# Patient Record
Sex: MALE | Race: WHITE | NOT HISPANIC OR LATINO | Employment: OTHER | URBAN - METROPOLITAN AREA
[De-identification: names, ages, dates, MRNs, and addresses within clinical notes are randomized per-mention and may not be internally consistent; named-entity substitution may affect disease eponyms.]

---

## 2023-10-01 DIAGNOSIS — M54.50 LOW BACK PAIN, UNSPECIFIED BACK PAIN LATERALITY, UNSPECIFIED CHRONICITY, UNSPECIFIED WHETHER SCIATICA PRESENT: ICD-10-CM

## 2023-10-01 DIAGNOSIS — F41.9 ANXIETY: ICD-10-CM

## 2023-10-01 DIAGNOSIS — I10 ACCELERATED HYPERTENSION: ICD-10-CM

## 2023-10-01 DIAGNOSIS — K52.9 CHRONIC COLITIS: ICD-10-CM

## 2023-10-04 RX ORDER — ASPIRIN 325 MG
325 TABLET ORAL DAILY
Qty: 90 TABLET | Refills: 0 | Status: SHIPPED | OUTPATIENT
Start: 2023-10-04 | End: 2024-01-02

## 2023-10-04 RX ORDER — LISINOPRIL 10 MG/1
10 TABLET ORAL DAILY
Qty: 90 TABLET | Refills: 0 | Status: SHIPPED | OUTPATIENT
Start: 2023-10-04 | End: 2023-10-09

## 2023-10-04 RX ORDER — SULFASALAZINE 500 MG/1
500 TABLET ORAL DAILY
Qty: 90 TABLET | Refills: 0 | Status: SHIPPED | OUTPATIENT
Start: 2023-10-04 | End: 2023-10-27 | Stop reason: SDUPTHER

## 2023-10-04 RX ORDER — SERTRALINE HYDROCHLORIDE 50 MG/1
50 TABLET, FILM COATED ORAL DAILY
Qty: 90 TABLET | Refills: 0 | Status: SHIPPED | OUTPATIENT
Start: 2023-10-04 | End: 2024-01-02

## 2023-10-06 DIAGNOSIS — E78.00 ELEVATED CHOLESTEROL: ICD-10-CM

## 2023-10-06 DIAGNOSIS — E11.9 TYPE 2 DIABETES MELLITUS WITHOUT COMPLICATIONS (MULTI): ICD-10-CM

## 2023-10-06 DIAGNOSIS — I10 ACCELERATED HYPERTENSION: ICD-10-CM

## 2023-10-09 DIAGNOSIS — I10 ACCELERATED HYPERTENSION: ICD-10-CM

## 2023-10-09 RX ORDER — LISINOPRIL 10 MG/1
10 TABLET ORAL DAILY
Qty: 30 TABLET | Refills: 0 | Status: SHIPPED | OUTPATIENT
Start: 2023-10-09 | End: 2023-12-21

## 2023-10-09 RX ORDER — AMLODIPINE BESYLATE 10 MG/1
10 TABLET ORAL DAILY
Qty: 30 TABLET | Refills: 0 | Status: SHIPPED | OUTPATIENT
Start: 2023-10-09 | End: 2024-03-25

## 2023-10-09 RX ORDER — LISINOPRIL 10 MG/1
10 TABLET ORAL DAILY
Qty: 90 TABLET | Refills: 0 | OUTPATIENT
Start: 2023-10-09

## 2023-10-09 RX ORDER — ATORVASTATIN CALCIUM 20 MG/1
20 TABLET, FILM COATED ORAL DAILY
Qty: 30 TABLET | Refills: 0 | Status: SHIPPED | OUTPATIENT
Start: 2023-10-09

## 2023-10-09 RX ORDER — METFORMIN HYDROCHLORIDE 1000 MG/1
1000 TABLET ORAL
Qty: 60 TABLET | Refills: 0 | Status: SHIPPED | OUTPATIENT
Start: 2023-10-09 | End: 2023-10-27 | Stop reason: SDUPTHER

## 2023-10-25 PROBLEM — E78.5 HYPERLIPIDEMIA: Status: ACTIVE | Noted: 2023-10-25

## 2023-10-25 PROBLEM — E11.9 DIABETES MELLITUS (MULTI): Status: ACTIVE | Noted: 2023-10-25

## 2023-10-25 PROBLEM — E29.9 TESTICULAR DYSFUNCTION: Status: ACTIVE | Noted: 2023-10-25

## 2023-10-25 PROBLEM — E55.9 VITAMIN D DEFICIENCY: Status: ACTIVE | Noted: 2023-10-25

## 2023-10-25 PROBLEM — E11.9 TYPE 2 DIABETES MELLITUS WITHOUT COMPLICATION (MULTI): Status: ACTIVE | Noted: 2023-10-25

## 2023-10-25 PROBLEM — F17.200 SMOKING: Status: ACTIVE | Noted: 2023-10-25

## 2023-10-25 PROBLEM — D72.829 LEUKOCYTOSIS: Status: ACTIVE | Noted: 2023-10-25

## 2023-10-25 PROBLEM — I10 HTN (HYPERTENSION): Status: ACTIVE | Noted: 2023-10-25

## 2023-10-25 PROBLEM — J44.9 COPD (CHRONIC OBSTRUCTIVE PULMONARY DISEASE) (MULTI): Status: ACTIVE | Noted: 2023-10-25

## 2023-10-25 PROBLEM — I73.9 PERIPHERAL VASCULAR DISEASE (CMS-HCC): Status: ACTIVE | Noted: 2023-10-25

## 2023-10-25 PROBLEM — F43.21 GRIEF: Status: ACTIVE | Noted: 2023-10-25

## 2023-10-25 PROBLEM — R91.1 LUNG NODULE: Status: ACTIVE | Noted: 2023-10-25

## 2023-10-25 RX ORDER — SULFASALAZINE 500 MG/1
2 TABLET ORAL 2 TIMES DAILY
COMMUNITY

## 2023-10-25 RX ORDER — METFORMIN HYDROCHLORIDE 1000 MG/1
1 TABLET ORAL
COMMUNITY
Start: 2016-12-20 | End: 2024-03-08 | Stop reason: SDUPTHER

## 2023-10-25 RX ORDER — BLOOD-GLUCOSE METER
KIT MISCELLANEOUS 2 TIMES DAILY
COMMUNITY
Start: 2013-10-15

## 2023-10-25 RX ORDER — NAPROXEN SODIUM 220 MG/1
1 TABLET, FILM COATED ORAL DAILY
COMMUNITY

## 2023-10-25 RX ORDER — BUDESONIDE AND FORMOTEROL FUMARATE DIHYDRATE 160; 4.5 UG/1; UG/1
2 AEROSOL RESPIRATORY (INHALATION) 2 TIMES DAILY
COMMUNITY
Start: 2016-01-18 | End: 2024-01-04

## 2023-10-25 RX ORDER — TAMSULOSIN HYDROCHLORIDE 0.4 MG/1
1 CAPSULE ORAL DAILY
COMMUNITY
Start: 2022-01-22

## 2023-10-25 RX ORDER — ERGOCALCIFEROL 1.25 MG/1
1 CAPSULE ORAL
COMMUNITY
Start: 2022-01-27

## 2023-10-25 RX ORDER — IPRATROPIUM BROMIDE 17 UG/1
2 AEROSOL, METERED RESPIRATORY (INHALATION) 4 TIMES DAILY PRN
COMMUNITY
Start: 2011-06-28 | End: 2024-04-05

## 2023-10-25 RX ORDER — LANCETS
EACH MISCELLANEOUS 2 TIMES DAILY
COMMUNITY
Start: 2013-10-15

## 2023-10-25 RX ORDER — POTASSIUM CITRATE 10 MEQ/1
1 TABLET, EXTENDED RELEASE ORAL 2 TIMES DAILY
COMMUNITY
Start: 2022-11-03

## 2023-10-25 RX ORDER — ALBUTEROL SULFATE 90 UG/1
2 AEROSOL, METERED RESPIRATORY (INHALATION) EVERY 4 HOURS PRN
COMMUNITY
Start: 2016-10-27

## 2023-10-25 RX ORDER — ATORVASTATIN CALCIUM 20 MG/1
1 TABLET, FILM COATED ORAL DAILY
COMMUNITY

## 2023-10-25 RX ORDER — NITROFURANTOIN (MACROCRYSTALS) 100 MG/1
1 CAPSULE ORAL 4 TIMES DAILY
COMMUNITY

## 2023-10-27 ENCOUNTER — OFFICE VISIT (OUTPATIENT)
Dept: PRIMARY CARE | Facility: CLINIC | Age: 68
End: 2023-10-27
Payer: MEDICARE

## 2023-10-27 VITALS
HEART RATE: 101 BPM | BODY MASS INDEX: 18.8 KG/M2 | HEIGHT: 66 IN | DIASTOLIC BLOOD PRESSURE: 60 MMHG | OXYGEN SATURATION: 96 % | WEIGHT: 117 LBS | RESPIRATION RATE: 20 BRPM | SYSTOLIC BLOOD PRESSURE: 136 MMHG | TEMPERATURE: 98 F

## 2023-10-27 DIAGNOSIS — E11.9 TYPE 2 DIABETES MELLITUS WITHOUT COMPLICATION, UNSPECIFIED WHETHER LONG TERM INSULIN USE (MULTI): ICD-10-CM

## 2023-10-27 DIAGNOSIS — K52.9 CHRONIC COLITIS: ICD-10-CM

## 2023-10-27 DIAGNOSIS — R53.83 OTHER FATIGUE: ICD-10-CM

## 2023-10-27 DIAGNOSIS — E11.9 TYPE 2 DIABETES MELLITUS WITHOUT COMPLICATIONS (MULTI): ICD-10-CM

## 2023-10-27 DIAGNOSIS — Z00.00 ANNUAL PHYSICAL EXAM: Primary | ICD-10-CM

## 2023-10-27 DIAGNOSIS — Z23 ENCOUNTER FOR IMMUNIZATION: ICD-10-CM

## 2023-10-27 LAB
ALBUMIN SERPL-MCNC: 4.1 G/DL (ref 3.5–5)
ALP BLD-CCNC: 100 U/L (ref 35–125)
ALT SERPL-CCNC: 17 U/L (ref 5–40)
ANION GAP SERPL CALC-SCNC: 14 MMOL/L
AST SERPL-CCNC: 17 U/L (ref 5–40)
BASOPHILS # BLD AUTO: 0.09 X10*3/UL (ref 0–0.1)
BASOPHILS NFR BLD AUTO: 1 %
BILIRUB SERPL-MCNC: 0.3 MG/DL (ref 0.1–1.2)
BUN SERPL-MCNC: 17 MG/DL (ref 8–25)
CALCIUM SERPL-MCNC: 9.6 MG/DL (ref 8.5–10.4)
CHLORIDE SERPL-SCNC: 103 MMOL/L (ref 97–107)
CHOLEST SERPL-MCNC: 126 MG/DL (ref 133–200)
CHOLEST/HDLC SERPL: 3 {RATIO}
CO2 SERPL-SCNC: 25 MMOL/L (ref 24–31)
CREAT SERPL-MCNC: 0.9 MG/DL (ref 0.4–1.6)
EOSINOPHIL # BLD AUTO: 0.3 X10*3/UL (ref 0–0.7)
EOSINOPHIL NFR BLD AUTO: 3.2 %
ERYTHROCYTE [DISTWIDTH] IN BLOOD BY AUTOMATED COUNT: 13.3 % (ref 11.5–14.5)
GFR SERPL CREATININE-BSD FRML MDRD: >90 ML/MIN/1.73M*2
GLUCOSE SERPL-MCNC: 90 MG/DL (ref 65–99)
HCT VFR BLD AUTO: 39.9 % (ref 41–52)
HDLC SERPL-MCNC: 42 MG/DL
HGB BLD-MCNC: 13.2 G/DL (ref 13.5–17.5)
IMM GRANULOCYTES # BLD AUTO: 0.09 X10*3/UL (ref 0–0.7)
IMM GRANULOCYTES NFR BLD AUTO: 1 % (ref 0–0.9)
LDLC SERPL CALC-MCNC: 72 MG/DL (ref 65–130)
LYMPHOCYTES # BLD AUTO: 1.92 X10*3/UL (ref 1.2–4.8)
LYMPHOCYTES NFR BLD AUTO: 20.4 %
MCH RBC QN AUTO: 31.5 PG (ref 26–34)
MCHC RBC AUTO-ENTMCNC: 33.1 G/DL (ref 32–36)
MCV RBC AUTO: 95 FL (ref 80–100)
MONOCYTES # BLD AUTO: 0.7 X10*3/UL (ref 0.1–1)
MONOCYTES NFR BLD AUTO: 7.4 %
NEUTROPHILS # BLD AUTO: 6.32 X10*3/UL (ref 1.2–7.7)
NEUTROPHILS NFR BLD AUTO: 67 %
NRBC BLD-RTO: 0 /100 WBCS (ref 0–0)
PLATELET # BLD AUTO: 354 X10*3/UL (ref 150–450)
PMV BLD AUTO: 10.2 FL (ref 7.5–11.5)
POC HEMOGLOBIN A1C: 5.9 % (ref 4.2–6.5)
POTASSIUM SERPL-SCNC: 4.7 MMOL/L (ref 3.4–5.1)
PROT SERPL-MCNC: 6.5 G/DL (ref 5.9–7.9)
RBC # BLD AUTO: 4.19 X10*6/UL (ref 4.5–5.9)
SODIUM SERPL-SCNC: 142 MMOL/L (ref 133–145)
TRIGL SERPL-MCNC: 61 MG/DL (ref 40–150)
TSH SERPL DL<=0.05 MIU/L-ACNC: 2.36 MIU/L (ref 0.27–4.2)
WBC # BLD AUTO: 9.4 X10*3/UL (ref 4.4–11.3)

## 2023-10-27 PROCEDURE — 80061 LIPID PANEL: CPT | Performed by: FAMILY MEDICINE

## 2023-10-27 PROCEDURE — 85025 COMPLETE CBC W/AUTO DIFF WBC: CPT | Performed by: FAMILY MEDICINE

## 2023-10-27 PROCEDURE — 99397 PER PM REEVAL EST PAT 65+ YR: CPT | Performed by: FAMILY MEDICINE

## 2023-10-27 PROCEDURE — 84443 ASSAY THYROID STIM HORMONE: CPT | Performed by: FAMILY MEDICINE

## 2023-10-27 PROCEDURE — 84075 ASSAY ALKALINE PHOSPHATASE: CPT | Performed by: FAMILY MEDICINE

## 2023-10-27 PROCEDURE — 1159F MED LIST DOCD IN RCRD: CPT | Performed by: FAMILY MEDICINE

## 2023-10-27 PROCEDURE — 1126F AMNT PAIN NOTED NONE PRSNT: CPT | Performed by: FAMILY MEDICINE

## 2023-10-27 PROCEDURE — 3075F SYST BP GE 130 - 139MM HG: CPT | Performed by: FAMILY MEDICINE

## 2023-10-27 PROCEDURE — 83036 HEMOGLOBIN GLYCOSYLATED A1C: CPT | Mod: QW | Performed by: FAMILY MEDICINE

## 2023-10-27 PROCEDURE — 3078F DIAST BP <80 MM HG: CPT | Performed by: FAMILY MEDICINE

## 2023-10-27 PROCEDURE — 3048F LDL-C <100 MG/DL: CPT | Performed by: FAMILY MEDICINE

## 2023-10-27 PROCEDURE — 36415 COLL VENOUS BLD VENIPUNCTURE: CPT | Performed by: FAMILY MEDICINE

## 2023-10-27 PROCEDURE — 99397 PER PM REEVAL EST PAT 65+ YR: CPT | Mod: MUE | Performed by: FAMILY MEDICINE

## 2023-10-27 PROCEDURE — 4010F ACE/ARB THERAPY RXD/TAKEN: CPT | Performed by: FAMILY MEDICINE

## 2023-10-27 PROCEDURE — 90662 IIV NO PRSV INCREASED AG IM: CPT | Performed by: FAMILY MEDICINE

## 2023-10-27 RX ORDER — METFORMIN HYDROCHLORIDE 1000 MG/1
1000 TABLET ORAL
Qty: 60 TABLET | Refills: 0 | Status: SHIPPED | OUTPATIENT
Start: 2023-10-27 | End: 2023-12-07

## 2023-10-27 RX ORDER — SULFASALAZINE 500 MG/1
500 TABLET ORAL DAILY
Qty: 90 TABLET | Refills: 0 | Status: SHIPPED | OUTPATIENT
Start: 2023-10-27 | End: 2024-04-05

## 2023-10-27 ASSESSMENT — PAIN SCALES - GENERAL: PAINLEVEL: 0-NO PAIN

## 2023-10-27 NOTE — PROGRESS NOTES
"Subjective   Patient ID: Jason Martin is a 68 y.o. male who presents for Annual Exam (Pt here for physical).    HPI     Review of Systems    Objective   /60 (BP Location: Left arm, Patient Position: Sitting, BP Cuff Size: Adult)   Pulse 101   Temp 36.7 °C (98 °F) (Temporal)   Resp 20   Ht 1.676 m (5' 6\")   Wt 53.1 kg (117 lb)   SpO2 96%   BMI 18.88 kg/m²     Physical Exam  Vitals and nursing note reviewed.   Constitutional:       General: He is not in acute distress.  HENT:      Right Ear: Tympanic membrane and ear canal normal.      Left Ear: Tympanic membrane and ear canal normal.      Nose: Nose normal. No rhinorrhea.      Mouth/Throat:      Pharynx: Oropharynx is clear. No oropharyngeal exudate or posterior oropharyngeal erythema.      Comments: Dentition wnl  Eyes:      Extraocular Movements: Extraocular movements intact.      Conjunctiva/sclera: Conjunctivae normal.      Pupils: Pupils are equal, round, and reactive to light.   Neck:      Vascular: No carotid bruit.   Cardiovascular:      Rate and Rhythm: Normal rate and regular rhythm.      Heart sounds: Normal heart sounds. No murmur heard.  Pulmonary:      Breath sounds: Normal breath sounds. No wheezing or rhonchi.   Abdominal:      General: Bowel sounds are normal. There is no distension.      Palpations: Abdomen is soft. There is no mass.      Tenderness: There is no abdominal tenderness. There is no guarding or rebound.      Hernia: No hernia is present.   Musculoskeletal:         General: No swelling or tenderness. Normal range of motion.      Cervical back: Normal range of motion and neck supple.   Lymphadenopathy:      Cervical: No cervical adenopathy.   Skin:     General: Skin is warm.      Findings: No rash.   Neurological:      General: No focal deficit present.      Mental Status: He is alert.         Assessment/Plan   Problem List Items Addressed This Visit             ICD-10-CM    Type 2 diabetes mellitus without complication " (CMS/[facility]) E11.9    Relevant Orders    POCT glycosylated hemoglobin (Hb A1C) manually resulted

## 2023-12-07 DIAGNOSIS — E11.9 TYPE 2 DIABETES MELLITUS WITHOUT COMPLICATIONS (MULTI): ICD-10-CM

## 2023-12-07 RX ORDER — METFORMIN HYDROCHLORIDE 1000 MG/1
1000 TABLET ORAL
Qty: 60 TABLET | Refills: 0 | Status: SHIPPED | OUTPATIENT
Start: 2023-12-07 | End: 2024-02-01

## 2023-12-21 DIAGNOSIS — I10 ACCELERATED HYPERTENSION: ICD-10-CM

## 2023-12-21 RX ORDER — LISINOPRIL 10 MG/1
10 TABLET ORAL DAILY
Qty: 90 TABLET | Refills: 1 | Status: SHIPPED | OUTPATIENT
Start: 2023-12-21 | End: 2024-03-20

## 2023-12-30 DIAGNOSIS — M54.50 LOW BACK PAIN, UNSPECIFIED BACK PAIN LATERALITY, UNSPECIFIED CHRONICITY, UNSPECIFIED WHETHER SCIATICA PRESENT: ICD-10-CM

## 2023-12-30 DIAGNOSIS — J42 CHRONIC BRONCHITIS, UNSPECIFIED CHRONIC BRONCHITIS TYPE (MULTI): ICD-10-CM

## 2023-12-30 DIAGNOSIS — F41.9 ANXIETY: ICD-10-CM

## 2024-01-02 RX ORDER — SERTRALINE HYDROCHLORIDE 50 MG/1
50 TABLET, FILM COATED ORAL DAILY
Qty: 90 TABLET | Refills: 0 | Status: SHIPPED | OUTPATIENT
Start: 2024-01-02 | End: 2024-02-01

## 2024-01-02 RX ORDER — ASPIRIN 325 MG
325 TABLET ORAL DAILY
Qty: 90 TABLET | Refills: 0 | Status: SHIPPED | OUTPATIENT
Start: 2024-01-02 | End: 2024-04-05

## 2024-01-04 RX ORDER — BUDESONIDE AND FORMOTEROL FUMARATE DIHYDRATE 160; 4.5 UG/1; UG/1
AEROSOL RESPIRATORY (INHALATION)
Qty: 30.6 EACH | Refills: 1 | Status: SHIPPED | OUTPATIENT
Start: 2024-01-04 | End: 2024-04-05

## 2024-02-01 DIAGNOSIS — F41.9 ANXIETY: ICD-10-CM

## 2024-02-01 DIAGNOSIS — E11.9 TYPE 2 DIABETES MELLITUS WITHOUT COMPLICATIONS (MULTI): ICD-10-CM

## 2024-02-01 RX ORDER — SERTRALINE HYDROCHLORIDE 50 MG/1
50 TABLET, FILM COATED ORAL DAILY
Qty: 90 TABLET | Refills: 0 | Status: SHIPPED | OUTPATIENT
Start: 2024-02-01 | End: 2024-05-02

## 2024-02-01 RX ORDER — METFORMIN HYDROCHLORIDE 1000 MG/1
1000 TABLET ORAL
Qty: 60 TABLET | Refills: 0 | Status: SHIPPED | OUTPATIENT
Start: 2024-02-01

## 2024-03-05 DIAGNOSIS — E11.9 TYPE 2 DIABETES MELLITUS WITHOUT COMPLICATIONS (MULTI): ICD-10-CM

## 2024-03-07 RX ORDER — METFORMIN HYDROCHLORIDE 1000 MG/1
1000 TABLET ORAL
Qty: 60 TABLET | Refills: 0 | OUTPATIENT
Start: 2024-03-07

## 2024-03-08 RX ORDER — METFORMIN HYDROCHLORIDE 1000 MG/1
1000 TABLET ORAL
Qty: 60 TABLET | Refills: 0 | Status: SHIPPED | OUTPATIENT
Start: 2024-03-08 | End: 2024-04-05

## 2024-03-25 DIAGNOSIS — E78.5 HYPERLIPIDEMIA, UNSPECIFIED: ICD-10-CM

## 2024-03-25 DIAGNOSIS — I10 ACCELERATED HYPERTENSION: ICD-10-CM

## 2024-03-25 RX ORDER — AMLODIPINE BESYLATE 10 MG/1
10 TABLET ORAL DAILY
Qty: 90 TABLET | Refills: 1 | Status: SHIPPED | OUTPATIENT
Start: 2024-03-25 | End: 2024-09-21

## 2024-03-25 RX ORDER — ATORVASTATIN CALCIUM 20 MG/1
20 TABLET, FILM COATED ORAL DAILY
Qty: 90 TABLET | Refills: 1 | Status: SHIPPED | OUTPATIENT
Start: 2024-03-25 | End: 2024-09-21

## 2024-04-03 DIAGNOSIS — M54.50 LOW BACK PAIN, UNSPECIFIED BACK PAIN LATERALITY, UNSPECIFIED CHRONICITY, UNSPECIFIED WHETHER SCIATICA PRESENT: ICD-10-CM

## 2024-04-03 DIAGNOSIS — K52.9 CHRONIC COLITIS: ICD-10-CM

## 2024-04-03 DIAGNOSIS — J44.9 CHRONIC OBSTRUCTIVE PULMONARY DISEASE, UNSPECIFIED COPD TYPE (MULTI): Primary | ICD-10-CM

## 2024-04-03 DIAGNOSIS — E11.9 TYPE 2 DIABETES MELLITUS WITHOUT COMPLICATIONS (MULTI): ICD-10-CM

## 2024-04-04 DIAGNOSIS — J42 CHRONIC BRONCHITIS, UNSPECIFIED CHRONIC BRONCHITIS TYPE (MULTI): ICD-10-CM

## 2024-04-05 DIAGNOSIS — J42 CHRONIC BRONCHITIS, UNSPECIFIED CHRONIC BRONCHITIS TYPE (MULTI): ICD-10-CM

## 2024-04-05 RX ORDER — METFORMIN HYDROCHLORIDE 1000 MG/1
1000 TABLET ORAL
Qty: 180 TABLET | Refills: 1 | Status: SHIPPED | OUTPATIENT
Start: 2024-04-05 | End: 2024-10-02

## 2024-04-05 RX ORDER — ASPIRIN 325 MG
325 TABLET ORAL DAILY
Qty: 90 TABLET | Refills: 1 | Status: SHIPPED | OUTPATIENT
Start: 2024-04-05

## 2024-04-05 RX ORDER — FLUTICASONE PROPIONATE AND SALMETEROL XINAFOATE 45; 21 UG/1; UG/1
2 AEROSOL, METERED RESPIRATORY (INHALATION)
Qty: 12 G | Refills: 3 | Status: SHIPPED | OUTPATIENT
Start: 2024-04-05 | End: 2024-04-30 | Stop reason: SDUPTHER

## 2024-04-05 RX ORDER — SULFASALAZINE 500 MG/1
500 TABLET ORAL DAILY
Qty: 90 TABLET | Refills: 3 | Status: SHIPPED | OUTPATIENT
Start: 2024-04-05

## 2024-04-05 RX ORDER — IPRATROPIUM BROMIDE 17 UG/1
2 AEROSOL, METERED RESPIRATORY (INHALATION) 4 TIMES DAILY PRN
Qty: 38.7 G | Refills: 1 | Status: SHIPPED | OUTPATIENT
Start: 2024-04-05 | End: 2024-04-30 | Stop reason: SDUPTHER

## 2024-04-09 RX ORDER — FLUTICASONE PROPIONATE AND SALMETEROL XINAFOATE 45; 21 UG/1; UG/1
AEROSOL, METERED RESPIRATORY (INHALATION)
Refills: 0 | OUTPATIENT
Start: 2024-04-09

## 2024-04-30 DIAGNOSIS — J42 CHRONIC BRONCHITIS, UNSPECIFIED CHRONIC BRONCHITIS TYPE (MULTI): ICD-10-CM

## 2024-04-30 DIAGNOSIS — J44.9 CHRONIC OBSTRUCTIVE PULMONARY DISEASE, UNSPECIFIED COPD TYPE (MULTI): ICD-10-CM

## 2024-04-30 DIAGNOSIS — F41.9 ANXIETY: ICD-10-CM

## 2024-04-30 RX ORDER — FLUTICASONE PROPIONATE AND SALMETEROL XINAFOATE 45; 21 UG/1; UG/1
2 AEROSOL, METERED RESPIRATORY (INHALATION)
Qty: 12 G | Refills: 3 | Status: SHIPPED | OUTPATIENT
Start: 2024-04-30

## 2024-04-30 RX ORDER — IPRATROPIUM BROMIDE 17 UG/1
2 AEROSOL, METERED RESPIRATORY (INHALATION) 4 TIMES DAILY PRN
Qty: 38.7 G | Refills: 11 | Status: SHIPPED | OUTPATIENT
Start: 2024-04-30 | End: 2025-04-30

## 2024-05-02 RX ORDER — SERTRALINE HYDROCHLORIDE 50 MG/1
50 TABLET, FILM COATED ORAL DAILY
Qty: 90 TABLET | Refills: 1 | Status: SHIPPED | OUTPATIENT
Start: 2024-05-02

## 2024-05-02 RX ORDER — FLUTICASONE PROPIONATE AND SALMETEROL XINAFOATE 45; 21 UG/1; UG/1
2 AEROSOL, METERED RESPIRATORY (INHALATION)
Qty: 31 G | Refills: 3 | Status: SHIPPED | OUTPATIENT
Start: 2024-05-02 | End: 2024-06-01

## 2024-05-02 RX ORDER — FLUTICASONE PROPIONATE AND SALMETEROL XINAFOATE 45; 21 UG/1; UG/1
AEROSOL, METERED RESPIRATORY (INHALATION)
Refills: 0 | OUTPATIENT
Start: 2024-05-02

## 2024-05-13 DIAGNOSIS — J44.9 CHRONIC OBSTRUCTIVE PULMONARY DISEASE, UNSPECIFIED COPD TYPE (MULTI): Primary | ICD-10-CM

## 2024-05-14 RX ORDER — BUDESONIDE AND FORMOTEROL FUMARATE DIHYDRATE 160; 4.5 UG/1; UG/1
2 AEROSOL RESPIRATORY (INHALATION)
Qty: 10.2 G | Refills: 11 | Status: SHIPPED | OUTPATIENT
Start: 2024-05-14 | End: 2024-05-16

## 2024-05-16 DIAGNOSIS — J44.9 CHRONIC OBSTRUCTIVE PULMONARY DISEASE, UNSPECIFIED COPD TYPE (MULTI): ICD-10-CM

## 2024-05-17 RX ORDER — FLUTICASONE FUROATE AND VILANTEROL 100; 25 UG/1; UG/1
1 POWDER RESPIRATORY (INHALATION) DAILY
Qty: 1 EACH | Refills: 3 | Status: SHIPPED | OUTPATIENT
Start: 2024-05-17

## 2024-07-01 DIAGNOSIS — I10 ACCELERATED HYPERTENSION: ICD-10-CM

## 2024-07-01 RX ORDER — LISINOPRIL 10 MG/1
10 TABLET ORAL DAILY
Qty: 90 TABLET | Refills: 0 | Status: SHIPPED | OUTPATIENT
Start: 2024-07-01

## 2024-07-08 DIAGNOSIS — E78.5 HYPERLIPIDEMIA, UNSPECIFIED: ICD-10-CM

## 2024-07-09 RX ORDER — ATORVASTATIN CALCIUM 20 MG/1
20 TABLET, FILM COATED ORAL DAILY
Qty: 90 TABLET | Refills: 1 | Status: SHIPPED | OUTPATIENT
Start: 2024-07-09

## 2024-09-05 ENCOUNTER — LAB (OUTPATIENT)
Dept: LAB | Facility: LAB | Age: 69
End: 2024-09-05
Payer: MEDICARE

## 2024-09-05 ENCOUNTER — OFFICE VISIT (OUTPATIENT)
Dept: PRIMARY CARE | Facility: CLINIC | Age: 69
End: 2024-09-05
Payer: MEDICARE

## 2024-09-05 ENCOUNTER — HOSPITAL ENCOUNTER (OUTPATIENT)
Dept: RADIOLOGY | Facility: HOSPITAL | Age: 69
Discharge: HOME | End: 2024-09-05
Payer: MEDICARE

## 2024-09-05 VITALS
HEART RATE: 101 BPM | HEIGHT: 66 IN | RESPIRATION RATE: 18 BRPM | SYSTOLIC BLOOD PRESSURE: 110 MMHG | BODY MASS INDEX: 16.04 KG/M2 | WEIGHT: 99.8 LBS | TEMPERATURE: 97.7 F | DIASTOLIC BLOOD PRESSURE: 60 MMHG | OXYGEN SATURATION: 93 %

## 2024-09-05 DIAGNOSIS — F17.200 HIGH DEPENDENCE ON SMOKING: Primary | ICD-10-CM
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
## 2024-09-05 DIAGNOSIS — J42 CHRONIC BRONCHITIS, UNSPECIFIED CHRONIC BRONCHITIS TYPE (MULTI): ICD-10-CM

## 2024-09-05 DIAGNOSIS — Z13.21 ENCOUNTER FOR VITAMIN DEFICIENCY SCREENING: ICD-10-CM

## 2024-09-05 DIAGNOSIS — R63.4 WEIGHT LOSS, ABNORMAL: ICD-10-CM

## 2024-09-05 DIAGNOSIS — Z12.5 SCREENING FOR PROSTATE CANCER: ICD-10-CM

## 2024-09-05 DIAGNOSIS — Z23 ENCOUNTER FOR IMMUNIZATION: ICD-10-CM

## 2024-09-05 DIAGNOSIS — R91.1 LUNG NODULE: ICD-10-CM

## 2024-09-05 DIAGNOSIS — E11.9 TYPE 2 DIABETES MELLITUS WITHOUT COMPLICATION, WITHOUT LONG-TERM CURRENT USE OF INSULIN (MULTI): ICD-10-CM

## 2024-09-05 DIAGNOSIS — Z13.220 SCREENING FOR LIPID DISORDERS: ICD-10-CM

## 2024-09-05 DIAGNOSIS — J44.9 CHRONIC OBSTRUCTIVE PULMONARY DISEASE, UNSPECIFIED COPD TYPE (MULTI): ICD-10-CM

## 2024-09-05 DIAGNOSIS — R91.8 MULTIPLE LUNG NODULES: ICD-10-CM

## 2024-09-05 DIAGNOSIS — I10 HYPERTENSION, UNSPECIFIED TYPE: ICD-10-CM

## 2024-09-05 DIAGNOSIS — E55.9 VITAMIN D DEFICIENCY: ICD-10-CM

## 2024-09-05 DIAGNOSIS — I10 ACCELERATED HYPERTENSION: ICD-10-CM

## 2024-09-05 LAB
ALBUMIN SERPL BCP-MCNC: 4 G/DL (ref 3.4–5)
ALP SERPL-CCNC: 79 U/L (ref 33–136)
ALT SERPL W P-5'-P-CCNC: 13 U/L (ref 10–52)
ANION GAP SERPL CALC-SCNC: 18 MMOL/L (ref 10–20)
AST SERPL W P-5'-P-CCNC: 18 U/L (ref 9–39)
BASOPHILS # BLD AUTO: 0.06 X10*3/UL (ref 0–0.1)
BASOPHILS NFR BLD AUTO: 0.6 %
BILIRUB SERPL-MCNC: 0.5 MG/DL (ref 0–1.2)
BUN SERPL-MCNC: 36 MG/DL (ref 6–23)
CALCIUM SERPL-MCNC: 9.7 MG/DL (ref 8.6–10.3)
CHLORIDE SERPL-SCNC: 100 MMOL/L (ref 98–107)
CHOLEST SERPL-MCNC: 96 MG/DL (ref 0–199)
CHOLESTEROL/HDL RATIO: 2.7
CO2 SERPL-SCNC: 25 MMOL/L (ref 21–32)
CREAT SERPL-MCNC: 1.03 MG/DL (ref 0.5–1.3)
EGFRCR SERPLBLD CKD-EPI 2021: 78 ML/MIN/1.73M*2
EOSINOPHIL # BLD AUTO: 0.18 X10*3/UL (ref 0–0.4)
EOSINOPHIL NFR BLD AUTO: 1.7 %
ERYTHROCYTE [DISTWIDTH] IN BLOOD BY AUTOMATED COUNT: 12.9 % (ref 11.5–14.5)
GLUCOSE SERPL-MCNC: 77 MG/DL (ref 74–99)
HCT VFR BLD AUTO: 41.8 % (ref 41–52)
HDLC SERPL-MCNC: 35.2 MG/DL
HGB BLD-MCNC: 13.6 G/DL (ref 13.5–17.5)
IMM GRANULOCYTES # BLD AUTO: 0.05 X10*3/UL (ref 0–0.5)
IMM GRANULOCYTES NFR BLD AUTO: 0.5 % (ref 0–0.9)
LDLC SERPL CALC-MCNC: 38 MG/DL
LYMPHOCYTES # BLD AUTO: 1.73 X10*3/UL (ref 0.8–3)
LYMPHOCYTES NFR BLD AUTO: 15.9 %
MCH RBC QN AUTO: 31.3 PG (ref 26–34)
MCHC RBC AUTO-ENTMCNC: 32.5 G/DL (ref 32–36)
MCV RBC AUTO: 96 FL (ref 80–100)
MONOCYTES # BLD AUTO: 0.85 X10*3/UL (ref 0.05–0.8)
MONOCYTES NFR BLD AUTO: 7.8 %
NEUTROPHILS # BLD AUTO: 8.02 X10*3/UL (ref 1.6–5.5)
NEUTROPHILS NFR BLD AUTO: 73.5 %
NON HDL CHOLESTEROL: 61 MG/DL (ref 0–149)
NRBC BLD-RTO: 0 /100 WBCS (ref 0–0)
PLATELET # BLD AUTO: 258 X10*3/UL (ref 150–450)
POC HEMOGLOBIN A1C: 5.9 % (ref 4.2–6.5)
POTASSIUM SERPL-SCNC: 5.8 MMOL/L (ref 3.5–5.3)
PROT SERPL-MCNC: 6.7 G/DL (ref 6.4–8.2)
RBC # BLD AUTO: 4.34 X10*6/UL (ref 4.5–5.9)
SODIUM SERPL-SCNC: 137 MMOL/L (ref 136–145)
TRIGL SERPL-MCNC: 112 MG/DL (ref 0–149)
VLDL: 22 MG/DL (ref 0–40)
WBC # BLD AUTO: 10.9 X10*3/UL (ref 4.4–11.3)

## 2024-09-05 PROCEDURE — 90662 IIV NO PRSV INCREASED AG IM: CPT | Performed by: NURSE PRACTITIONER

## 2024-09-05 PROCEDURE — 99214 OFFICE O/P EST MOD 30 MIN: CPT | Performed by: NURSE PRACTITIONER

## 2024-09-05 PROCEDURE — 1123F ACP DISCUSS/DSCN MKR DOCD: CPT | Performed by: NURSE PRACTITIONER

## 2024-09-05 PROCEDURE — 3074F SYST BP LT 130 MM HG: CPT | Performed by: NURSE PRACTITIONER

## 2024-09-05 PROCEDURE — 76377 3D RENDER W/INTRP POSTPROCES: CPT

## 2024-09-05 PROCEDURE — 3008F BODY MASS INDEX DOCD: CPT | Performed by: NURSE PRACTITIONER

## 2024-09-05 PROCEDURE — 4010F ACE/ARB THERAPY RXD/TAKEN: CPT | Performed by: NURSE PRACTITIONER

## 2024-09-05 PROCEDURE — 1158F ADVNC CARE PLAN TLK DOCD: CPT | Performed by: NURSE PRACTITIONER

## 2024-09-05 PROCEDURE — 1159F MED LIST DOCD IN RCRD: CPT | Performed by: NURSE PRACTITIONER

## 2024-09-05 PROCEDURE — 3078F DIAST BP <80 MM HG: CPT | Performed by: NURSE PRACTITIONER

## 2024-09-05 PROCEDURE — 3048F LDL-C <100 MG/DL: CPT | Performed by: NURSE PRACTITIONER

## 2024-09-05 PROCEDURE — 82306 VITAMIN D 25 HYDROXY: CPT

## 2024-09-05 PROCEDURE — 83036 HEMOGLOBIN GLYCOSYLATED A1C: CPT | Performed by: NURSE PRACTITIONER

## 2024-09-05 PROCEDURE — 70486 CT MAXILLOFACIAL W/O DYE: CPT

## 2024-09-05 PROCEDURE — 1125F AMNT PAIN NOTED PAIN PRSNT: CPT | Performed by: NURSE PRACTITIONER

## 2024-09-05 RX ORDER — AMLODIPINE BESYLATE 10 MG/1
10 TABLET ORAL DAILY
Qty: 90 TABLET | Refills: 1 | Status: SHIPPED | OUTPATIENT
Start: 2024-09-05 | End: 2024-09-05 | Stop reason: DRUGHIGH

## 2024-09-05 RX ORDER — IBUPROFEN 200 MG
1 TABLET ORAL EVERY 24 HOURS
Qty: 30 PATCH | Refills: 0 | Status: SHIPPED | OUTPATIENT
Start: 2024-09-05 | End: 2024-10-05

## 2024-09-05 RX ORDER — FLUTICASONE FUROATE AND VILANTEROL 200; 25 UG/1; UG/1
1 POWDER RESPIRATORY (INHALATION) DAILY
Qty: 1 EACH | Refills: 1 | Status: SHIPPED | OUTPATIENT
Start: 2024-09-05

## 2024-09-05 RX ORDER — PREDNISONE 20 MG/1
TABLET ORAL
Qty: 21 TABLET | Refills: 0 | Status: SHIPPED | OUTPATIENT
Start: 2024-09-05

## 2024-09-05 RX ORDER — AMLODIPINE BESYLATE 5 MG/1
5 TABLET ORAL DAILY
Qty: 30 TABLET | Refills: 5 | Status: SHIPPED | OUTPATIENT
Start: 2024-09-05 | End: 2025-03-04

## 2024-09-05 ASSESSMENT — LIFESTYLE VARIABLES
HOW OFTEN DO YOU HAVE SIX OR MORE DRINKS ON ONE OCCASION: NEVER
SKIP TO QUESTIONS 9-10: 1
HOW OFTEN DO YOU HAVE A DRINK CONTAINING ALCOHOL: NEVER
AUDIT-C TOTAL SCORE: 0
HOW MANY STANDARD DRINKS CONTAINING ALCOHOL DO YOU HAVE ON A TYPICAL DAY: PATIENT DOES NOT DRINK

## 2024-09-05 ASSESSMENT — PATIENT HEALTH QUESTIONNAIRE - PHQ9
2. FEELING DOWN, DEPRESSED OR HOPELESS: NOT AT ALL
1. LITTLE INTEREST OR PLEASURE IN DOING THINGS: NOT AT ALL
SUM OF ALL RESPONSES TO PHQ9 QUESTIONS 1 AND 2: 0

## 2024-09-05 ASSESSMENT — PAIN SCALES - GENERAL: PAINLEVEL: 5

## 2024-09-05 NOTE — PROGRESS NOTES
"Subjective   Patient ID: Jason Martin is a 69 y.o. male who presents for Fall (PT ACCOMPANIED BY DAUGHTER TO DISCUSS RECENT FALL 2 DAYS AGO. PT STATES HE DOES NOT KNOW WHAT HAPPENED. HE HIT HIS HEAD, THINKS HE BROKE HIS NOSE. PT DAUGHTER REPORTS PT WAS UNABLE TO GET UP, HAD INCONTINENCE/BM ACCIDENT. ).    PT FELL AT HOME a few days ago. Thinks he might of broken his nose, has not been here for a couple years, look pale and in some distress, pt smokes daily wheezing very audible , reviwed last labs ct  pet scan,         Review of Systems   Constitutional:  Positive for fatigue and unexpected weight change.   Respiratory:  Positive for cough, shortness of breath and wheezing.    Skin:         Cut on nose       Objective   /60   Pulse 101   Temp 36.5 °C (97.7 °F)   Resp 18   Ht 1.676 m (5' 6\")   Wt 45.3 kg (99 lb 12.8 oz)   SpO2 93%   BMI 16.11 kg/m²     Physical Exam  Constitutional:       Comments: Pale older than stated age wheezing appears ill   HENT:      Right Ear: Tympanic membrane normal.      Left Ear: Tympanic membrane normal.   Cardiovascular:      Rate and Rhythm: Normal rate and regular rhythm.   Pulmonary:      Comments: Wheezing throughout all lobes. Respirations pulling having mild retractions    Skin:     Comments: Reolving cut on nose   Neurological:      Mental Status: He is oriented to person, place, and time.   Psychiatric:         Behavior: Behavior normal.         Assessment/Plan   Problem List Items Addressed This Visit             ICD-10-CM    COPD (chronic obstructive pulmonary disease) (Multi) J44.9    Relevant Medications    fluticasone furoate-vilanteroL (Breo Ellipta) 200-25 mcg/dose inhaler    predniSONE (Deltasone) 20 mg tablet    Diabetes mellitus (Multi) E11.9    Relevant Orders    POCT glycosylated hemoglobin (Hb A1C) manually resulted (Completed)    HTN (hypertension) I10    Relevant Medications    amLODIPine (Norvasc) 5 mg tablet    Lung nodule R91.1    Vitamin D " deficiency E55.9    Relevant Orders    Vitamin D 25-Hydroxy,Total (for eval of Vitamin D levels) (Completed)     Other Visit Diagnoses         Codes    High dependence on smoking    -  Primary F17.200    Relevant Medications    nicotine (Nicoderm CQ) 21 mg/24 hr patch    nicotine (Nicoderm CQ) 14 mg/24 hr patch    Other Relevant Orders    CT chest w and wo IV contrast    Weight loss, abnormal     R63.4    Relevant Orders    CT chest w and wo IV contrast    CBC and Auto Differential (Completed)    Comprehensive Metabolic Panel (Completed)    Fall, initial encounter     W19.XXXA    Relevant Orders    CT maxillofacial bones wo IV contrast (Completed)    Screening for lipid disorders     Z13.220    Relevant Orders    Lipid Panel (Completed)    Encounter for vitamin deficiency screening     Z13.21    Screening for prostate cancer     Z12.5    Relevant Orders    PSA, total and free    Encounter for immunization     Z23    Relevant Orders    Flu vaccine, trivalent, preservative free, HIGH-DOSE, age 65y+ (Fluzone) (Completed)    Multiple lung nodules     R91.8    Relevant Orders    Referral to Pulmonology    Accelerated hypertension     I10          Discussed with pt  he needs further workup. Very concerned masst that was on his last ct 2 yrs ago may have changed, discussed.made appointment with dr morales for next week, changed bp meds and will get some blood work, daughter understands, less concerned about nose fracture reviewed labd and pet scan and last ct with pt, discussed need for follow up

## 2024-09-06 ENCOUNTER — TELEPHONE (OUTPATIENT)
Dept: PRIMARY CARE | Facility: CLINIC | Age: 69
End: 2024-09-06
Payer: MEDICARE

## 2024-09-06 DIAGNOSIS — E87.5 HYPERKALEMIA: ICD-10-CM

## 2024-09-06 DIAGNOSIS — F17.200 HIGH DEPENDENCE ON SMOKING: ICD-10-CM

## 2024-09-06 DIAGNOSIS — T14.8XXA FRACTURE: ICD-10-CM

## 2024-09-06 LAB — 25(OH)D3 SERPL-MCNC: 45 NG/ML (ref 30–100)

## 2024-09-06 PROCEDURE — 76377 3D RENDER W/INTRP POSTPROCES: CPT

## 2024-09-06 ASSESSMENT — ENCOUNTER SYMPTOMS
COUGH: 1
SHORTNESS OF BREATH: 1
WHEEZING: 1
UNEXPECTED WEIGHT CHANGE: 1
FATIGUE: 1

## 2024-09-07 LAB
PSA FREE MFR SERPL: 27 %
PSA FREE SERPL-MCNC: 0.6 NG/ML
PSA SERPL IA-MCNC: 2.2 NG/ML (ref 0–4)

## 2024-09-09 ENCOUNTER — APPOINTMENT (OUTPATIENT)
Dept: RADIOLOGY | Facility: HOSPITAL | Age: 69
End: 2024-09-09
Payer: MEDICARE

## 2024-09-09 ENCOUNTER — CLINICAL SUPPORT (OUTPATIENT)
Dept: PRIMARY CARE | Facility: CLINIC | Age: 69
End: 2024-09-09
Payer: MEDICARE

## 2024-09-09 ENCOUNTER — HOSPITAL ENCOUNTER (OUTPATIENT)
Dept: RADIOLOGY | Facility: HOSPITAL | Age: 69
Discharge: HOME | End: 2024-09-09
Payer: MEDICARE

## 2024-09-09 DIAGNOSIS — R63.4 WEIGHT LOSS, ABNORMAL: ICD-10-CM

## 2024-09-09 DIAGNOSIS — E87.5 HYPERKALEMIA: Primary | ICD-10-CM

## 2024-09-09 DIAGNOSIS — F17.200 HIGH DEPENDENCE ON SMOKING: ICD-10-CM

## 2024-09-09 LAB
ALBUMIN SERPL-MCNC: 4.2 G/DL (ref 3.5–5)
ALP BLD-CCNC: 83 U/L (ref 35–125)
ALT SERPL-CCNC: 14 U/L (ref 5–40)
ANION GAP SERPL CALC-SCNC: 14 MMOL/L
AST SERPL-CCNC: 15 U/L (ref 5–40)
BILIRUB SERPL-MCNC: 0.3 MG/DL (ref 0.1–1.2)
BUN SERPL-MCNC: 30 MG/DL (ref 8–25)
CALCIUM SERPL-MCNC: 9.9 MG/DL (ref 8.5–10.4)
CHLORIDE SERPL-SCNC: 100 MMOL/L (ref 97–107)
CO2 SERPL-SCNC: 27 MMOL/L (ref 24–31)
CREAT SERPL-MCNC: 0.8 MG/DL (ref 0.4–1.6)
EGFRCR SERPLBLD CKD-EPI 2021: >90 ML/MIN/1.73M*2
GLUCOSE SERPL-MCNC: 130 MG/DL (ref 65–99)
POTASSIUM SERPL-SCNC: 5.4 MMOL/L (ref 3.4–5.1)
PROT SERPL-MCNC: 6.9 G/DL (ref 5.9–7.9)
SODIUM SERPL-SCNC: 141 MMOL/L (ref 133–145)

## 2024-09-09 PROCEDURE — 71260 CT THORAX DX C+: CPT

## 2024-09-09 PROCEDURE — 36415 COLL VENOUS BLD VENIPUNCTURE: CPT

## 2024-09-09 PROCEDURE — 80053 COMPREHEN METABOLIC PANEL: CPT | Performed by: NURSE PRACTITIONER

## 2024-09-09 PROCEDURE — 2550000001 HC RX 255 CONTRASTS: Performed by: NURSE PRACTITIONER

## 2024-09-09 RX ORDER — IBUPROFEN 200 MG
1 TABLET ORAL EVERY 24 HOURS
Qty: 28 PATCH | Refills: 0 | Status: SHIPPED | OUTPATIENT
Start: 2024-09-09 | End: 2024-10-09

## 2024-09-10 DIAGNOSIS — E87.5 HYPERKALEMIA: ICD-10-CM

## 2024-09-19 ENCOUNTER — HOSPITAL ENCOUNTER (OUTPATIENT)
Dept: RADIOLOGY | Facility: CLINIC | Age: 69
Discharge: HOME | End: 2024-09-19
Payer: MEDICARE

## 2024-09-19 DIAGNOSIS — R91.8 OTHER NONSPECIFIC ABNORMAL FINDING OF LUNG FIELD: ICD-10-CM

## 2024-09-19 PROCEDURE — 3430000001 HC RX 343 DIAGNOSTIC RADIOPHARMACEUTICALS: Performed by: HOSPITALIST

## 2024-09-19 PROCEDURE — A9552 F18 FDG: HCPCS | Performed by: HOSPITALIST

## 2024-09-19 PROCEDURE — 78815 PET IMAGE W/CT SKULL-THIGH: CPT | Mod: PS

## 2024-09-19 PROCEDURE — 78815 PET IMAGE W/CT SKULL-THIGH: CPT | Performed by: STUDENT IN AN ORGANIZED HEALTH CARE EDUCATION/TRAINING PROGRAM

## 2024-09-19 RX ORDER — FLUDEOXYGLUCOSE F 18 200 MCI/ML
11.4 INJECTION, SOLUTION INTRAVENOUS
Status: COMPLETED | OUTPATIENT
Start: 2024-09-19 | End: 2024-09-19

## 2024-10-02 DIAGNOSIS — M54.50 LOW BACK PAIN, UNSPECIFIED BACK PAIN LATERALITY, UNSPECIFIED CHRONICITY, UNSPECIFIED WHETHER SCIATICA PRESENT: ICD-10-CM

## 2024-10-03 DIAGNOSIS — I10 ACCELERATED HYPERTENSION: ICD-10-CM

## 2024-10-03 RX ORDER — LISINOPRIL 10 MG/1
10 TABLET ORAL DAILY
Qty: 90 TABLET | Refills: 0 | Status: SHIPPED | OUTPATIENT
Start: 2024-10-03

## 2024-10-03 RX ORDER — ASPIRIN 325 MG
325 TABLET ORAL DAILY
Qty: 90 TABLET | Refills: 1 | Status: SHIPPED | OUTPATIENT
Start: 2024-10-03

## 2024-10-09 ENCOUNTER — APPOINTMENT (OUTPATIENT)
Dept: RESPIRATORY THERAPY | Facility: HOSPITAL | Age: 69
End: 2024-10-09

## 2024-10-10 ENCOUNTER — HOSPITAL ENCOUNTER (OUTPATIENT)
Dept: RESPIRATORY THERAPY | Facility: HOSPITAL | Age: 69
Setting detail: THERAPIES SERIES
Discharge: HOME | End: 2024-10-10
Payer: MEDICARE

## 2024-10-10 DIAGNOSIS — J44.9 COPD (CHRONIC OBSTRUCTIVE PULMONARY DISEASE) CASE MANAGEMENT PATIENT (MULTI): ICD-10-CM

## 2024-10-10 PROCEDURE — 94618 PULMONARY STRESS TESTING: CPT

## 2024-10-10 PROCEDURE — 94726 PLETHYSMOGRAPHY LUNG VOLUMES: CPT

## 2024-10-30 DIAGNOSIS — J44.9 CHRONIC OBSTRUCTIVE PULMONARY DISEASE, UNSPECIFIED COPD TYPE (MULTI): ICD-10-CM

## 2024-10-30 RX ORDER — FLUTICASONE FUROATE AND VILANTEROL 100; 25 UG/1; UG/1
1 POWDER RESPIRATORY (INHALATION) DAILY
Qty: 60 EACH | Refills: 0 | Status: SHIPPED | OUTPATIENT
Start: 2024-10-30

## 2024-10-30 RX ORDER — FLUTICASONE FUROATE AND VILANTEROL TRIFENATATE 200; 25 UG/1; UG/1
1 POWDER RESPIRATORY (INHALATION) DAILY
Qty: 60 EACH | Refills: 1 | Status: SHIPPED | OUTPATIENT
Start: 2024-10-30 | End: 2024-10-30

## 2024-11-07 DIAGNOSIS — J44.9 CHRONIC OBSTRUCTIVE PULMONARY DISEASE, UNSPECIFIED COPD TYPE (MULTI): ICD-10-CM

## 2024-11-07 DIAGNOSIS — F41.9 ANXIETY: ICD-10-CM

## 2024-11-08 ENCOUNTER — OFFICE VISIT (OUTPATIENT)
Dept: PRIMARY CARE | Facility: CLINIC | Age: 69
End: 2024-11-08
Payer: MEDICARE

## 2024-11-08 VITALS
SYSTOLIC BLOOD PRESSURE: 138 MMHG | HEIGHT: 66 IN | DIASTOLIC BLOOD PRESSURE: 78 MMHG | HEART RATE: 101 BPM | RESPIRATION RATE: 19 BRPM | TEMPERATURE: 96.2 F | WEIGHT: 102 LBS | OXYGEN SATURATION: 93 % | BODY MASS INDEX: 16.39 KG/M2

## 2024-11-08 DIAGNOSIS — E87.5 HYPERKALEMIA: ICD-10-CM

## 2024-11-08 DIAGNOSIS — J44.9 CHRONIC OBSTRUCTIVE PULMONARY DISEASE, UNSPECIFIED COPD TYPE (MULTI): ICD-10-CM

## 2024-11-08 DIAGNOSIS — Z00.00 ANNUAL PHYSICAL EXAM: Primary | ICD-10-CM

## 2024-11-08 PROBLEM — I70.90 ARTERIOSCLEROTIC VASCULAR DISEASE: Status: ACTIVE | Noted: 2024-11-08

## 2024-11-08 PROBLEM — R63.4 ABNORMAL WEIGHT LOSS: Status: ACTIVE | Noted: 2024-11-08

## 2024-11-08 LAB
ALBUMIN SERPL BCP-MCNC: 3.7 G/DL (ref 3.4–5)
ALP SERPL-CCNC: 89 U/L (ref 33–136)
ALT SERPL W P-5'-P-CCNC: 8 U/L (ref 10–52)
ANION GAP SERPL CALCULATED.3IONS-SCNC: 10 MMOL/L (ref 10–20)
AST SERPL W P-5'-P-CCNC: 12 U/L (ref 9–39)
BILIRUB SERPL-MCNC: 0.4 MG/DL (ref 0–1.2)
BUN SERPL-MCNC: 20 MG/DL (ref 6–23)
CALCIUM SERPL-MCNC: 9.4 MG/DL (ref 8.6–10.3)
CHLORIDE SERPL-SCNC: 104 MMOL/L (ref 98–107)
CO2 SERPL-SCNC: 32 MMOL/L (ref 21–32)
CREAT SERPL-MCNC: 0.67 MG/DL (ref 0.5–1.3)
EGFRCR SERPLBLD CKD-EPI 2021: >90 ML/MIN/1.73M*2
GLUCOSE SERPL-MCNC: 93 MG/DL (ref 74–99)
POTASSIUM SERPL-SCNC: 4.1 MMOL/L (ref 3.5–5.3)
PROT SERPL-MCNC: 6.3 G/DL (ref 6.4–8.2)
SODIUM SERPL-SCNC: 142 MMOL/L (ref 136–145)

## 2024-11-08 PROCEDURE — 36415 COLL VENOUS BLD VENIPUNCTURE: CPT | Performed by: FAMILY MEDICINE

## 2024-11-08 PROCEDURE — 99215 OFFICE O/P EST HI 40 MIN: CPT | Performed by: FAMILY MEDICINE

## 2024-11-08 PROCEDURE — 80053 COMPREHEN METABOLIC PANEL: CPT | Performed by: FAMILY MEDICINE

## 2024-11-08 PROCEDURE — 99417 PROLNG OP E/M EACH 15 MIN: CPT | Performed by: FAMILY MEDICINE

## 2024-11-08 RX ORDER — ALBUTEROL SULFATE 90 UG/1
2 INHALANT RESPIRATORY (INHALATION) EVERY 4 HOURS PRN
Qty: 18 G | Refills: 3 | Status: SHIPPED | OUTPATIENT
Start: 2024-11-08

## 2024-11-08 RX ORDER — SERTRALINE HYDROCHLORIDE 50 MG/1
50 TABLET, FILM COATED ORAL DAILY
Qty: 90 TABLET | Refills: 1 | Status: SHIPPED | OUTPATIENT
Start: 2024-11-08

## 2024-11-08 RX ORDER — FLUTICASONE FUROATE AND VILANTEROL TRIFENATATE 100; 25 UG/1; UG/1
1 POWDER RESPIRATORY (INHALATION) DAILY
Qty: 60 EACH | Refills: 0 | OUTPATIENT
Start: 2024-11-08

## 2024-11-08 ASSESSMENT — ACTIVITIES OF DAILY LIVING (ADL)
TAKING_MEDICATION: INDEPENDENT
GROCERY_SHOPPING: INDEPENDENT
DOING_HOUSEWORK: INDEPENDENT
MANAGING_FINANCES: NEEDS ASSISTANCE
BATHING: INDEPENDENT
DRESSING: INDEPENDENT

## 2024-11-08 ASSESSMENT — PATIENT HEALTH QUESTIONNAIRE - PHQ9
1. LITTLE INTEREST OR PLEASURE IN DOING THINGS: NOT AT ALL
2. FEELING DOWN, DEPRESSED OR HOPELESS: NOT AT ALL
SUM OF ALL RESPONSES TO PHQ9 QUESTIONS 1 AND 2: 0

## 2024-11-08 ASSESSMENT — PAIN SCALES - GENERAL: PAINLEVEL_OUTOF10: 0-NO PAIN

## 2024-11-08 NOTE — PROGRESS NOTES
"Subjective   Patient ID: Jose Martin is a 69 y.o. male who presents for Weight Loss and Medicare Annual Wellness Visit Subsequent.    HPI pt has gained 4 lbs ever since stopping metformin he has had increased appetite    Review of Systems    Objective   /78   Pulse 101   Temp 35.7 °C (96.2 °F)   Resp 19   Ht 1.676 m (5' 6\")   Wt 46.3 kg (102 lb)   SpO2 93%   BMI 16.46 kg/m²     Physical Exam  Vitals and nursing note reviewed.   Constitutional:       General: He is not in acute distress.  HENT:      Right Ear: Tympanic membrane and ear canal normal.      Left Ear: Tympanic membrane and ear canal normal.      Nose: Nose normal. No rhinorrhea.      Mouth/Throat:      Pharynx: Oropharynx is clear. No oropharyngeal exudate or posterior oropharyngeal erythema.      Comments: Dentition wnl  Eyes:      Extraocular Movements: Extraocular movements intact.      Conjunctiva/sclera: Conjunctivae normal.      Pupils: Pupils are equal, round, and reactive to light.   Neck:      Vascular: No carotid bruit.   Cardiovascular:      Rate and Rhythm: Normal rate and regular rhythm.      Heart sounds: Normal heart sounds. No murmur heard.  Pulmonary:      Breath sounds: Normal breath sounds. No wheezing or rhonchi.   Abdominal:      General: Bowel sounds are normal. There is no distension.      Palpations: Abdomen is soft. There is no mass.      Tenderness: There is no abdominal tenderness. There is no guarding or rebound.      Hernia: No hernia is present.   Musculoskeletal:         General: No swelling or tenderness. Normal range of motion.      Cervical back: Normal range of motion and neck supple.   Lymphadenopathy:      Cervical: No cervical adenopathy.   Skin:     General: Skin is warm.      Findings: No rash.   Neurological:      General: No focal deficit present.      Mental Status: He is alert.         Assessment/Plan   Problem List Items Addressed This Visit             ICD-10-CM    COPD (chronic obstructive " pulmonary disease) (Multi) J44.9    Relevant Medications    albuterol (Ventolin HFA) 90 mcg/actuation inhaler    Annual physical exam - Primary Z00.00     Other Visit Diagnoses         Codes    Hyperkalemia     E87.5    Relevant Orders    Comprehensive Metabolic Panel

## 2024-11-08 NOTE — PROGRESS NOTES
"Subjective   Patient ID: Jose Martin is a 69 y.o. male who presents for Weight Loss and Medicare Annual Wellness Visit Subsequent.    HPI pt keeps losing weight and is concerned     Review of Systems    Objective   /78   Pulse 101   Temp 35.7 °C (96.2 °F)   Resp 19   Ht 1.676 m (5' 6\")   Wt 46.3 kg (102 lb)   SpO2 93%   BMI 16.46 kg/m²     Physical Exam    Assessment/Plan          "

## 2025-02-08 DIAGNOSIS — I10 ACCELERATED HYPERTENSION: ICD-10-CM

## 2025-02-10 RX ORDER — LISINOPRIL 10 MG/1
10 TABLET ORAL DAILY
Qty: 90 TABLET | Refills: 0 | Status: SHIPPED | OUTPATIENT
Start: 2025-02-10

## 2025-02-11 DIAGNOSIS — J44.9 CHRONIC OBSTRUCTIVE PULMONARY DISEASE, UNSPECIFIED COPD TYPE (MULTI): ICD-10-CM

## 2025-02-11 DIAGNOSIS — J42 CHRONIC BRONCHITIS, UNSPECIFIED CHRONIC BRONCHITIS TYPE (MULTI): ICD-10-CM

## 2025-02-11 RX ORDER — ALBUTEROL SULFATE 90 UG/1
2 INHALANT RESPIRATORY (INHALATION) EVERY 4 HOURS PRN
Qty: 18 G | Refills: 3 | Status: SHIPPED | OUTPATIENT
Start: 2025-02-11

## 2025-02-11 RX ORDER — FLUTICASONE PROPIONATE AND SALMETEROL XINAFOATE 45; 21 UG/1; UG/1
2 AEROSOL, METERED RESPIRATORY (INHALATION)
Qty: 12 G | Refills: 3 | Status: SHIPPED | OUTPATIENT
Start: 2025-02-11

## 2025-02-11 RX ORDER — IPRATROPIUM BROMIDE 17 UG/1
2 AEROSOL, METERED RESPIRATORY (INHALATION) 4 TIMES DAILY PRN
Qty: 38.7 G | Refills: 11 | Status: SHIPPED | OUTPATIENT
Start: 2025-02-11 | End: 2026-02-11

## 2025-03-03 ENCOUNTER — TELEPHONE (OUTPATIENT)
Dept: PRIMARY CARE | Facility: CLINIC | Age: 70
End: 2025-03-03
Payer: MEDICARE

## 2025-03-03 DIAGNOSIS — R53.83 OTHER FATIGUE: ICD-10-CM

## 2025-03-03 DIAGNOSIS — M62.81 MUSCLE WEAKNESS: ICD-10-CM

## 2025-03-06 ENCOUNTER — APPOINTMENT (OUTPATIENT)
Dept: RADIOLOGY | Facility: HOSPITAL | Age: 70
End: 2025-03-06
Payer: MEDICARE

## 2025-03-06 ENCOUNTER — APPOINTMENT (OUTPATIENT)
Dept: CARDIOLOGY | Facility: HOSPITAL | Age: 70
End: 2025-03-06
Payer: MEDICARE

## 2025-03-06 ENCOUNTER — HOSPITAL ENCOUNTER (INPATIENT)
Facility: HOSPITAL | Age: 70
End: 2025-03-06
Attending: STUDENT IN AN ORGANIZED HEALTH CARE EDUCATION/TRAINING PROGRAM | Admitting: INTERNAL MEDICINE
Payer: MEDICARE

## 2025-03-06 DIAGNOSIS — K52.9 CHRONIC COLITIS: ICD-10-CM

## 2025-03-06 DIAGNOSIS — J10.1 INFLUENZA A: Primary | ICD-10-CM

## 2025-03-06 DIAGNOSIS — R06.03 ACUTE RESPIRATORY DISTRESS: ICD-10-CM

## 2025-03-06 DIAGNOSIS — J44.9 COPD, SEVERE (MULTI): ICD-10-CM

## 2025-03-06 DIAGNOSIS — J96.21 ACUTE ON CHRONIC RESPIRATORY FAILURE WITH HYPOXIA (MULTI): ICD-10-CM

## 2025-03-06 DIAGNOSIS — E87.29 RESPIRATORY ACIDOSIS: ICD-10-CM

## 2025-03-06 DIAGNOSIS — D72.829 LEUKOCYTOSIS, UNSPECIFIED TYPE: ICD-10-CM

## 2025-03-06 DIAGNOSIS — J15.9 BACTERIAL PNEUMONIA: ICD-10-CM

## 2025-03-06 DIAGNOSIS — J44.1 COPD EXACERBATION (MULTI): ICD-10-CM

## 2025-03-06 DIAGNOSIS — J96.01 ACUTE HYPOXIC RESPIRATORY FAILURE (MULTI): ICD-10-CM

## 2025-03-06 DIAGNOSIS — J44.1 COPD WITH ACUTE EXACERBATION (MULTI): ICD-10-CM

## 2025-03-06 LAB
ALBUMIN SERPL BCP-MCNC: 3.4 G/DL (ref 3.4–5)
ALP SERPL-CCNC: 68 U/L (ref 33–136)
ALT SERPL W P-5'-P-CCNC: 19 U/L (ref 10–52)
ANION GAP SERPL CALCULATED.3IONS-SCNC: 14 MMOL/L (ref 10–20)
AST SERPL W P-5'-P-CCNC: 20 U/L (ref 9–39)
BASE EXCESS BLDV CALC-SCNC: 5.7 MMOL/L (ref -2–3)
BASE EXCESS BLDV CALC-SCNC: 7.3 MMOL/L (ref -2–3)
BASOPHILS # BLD AUTO: 0.04 X10*3/UL (ref 0–0.1)
BASOPHILS NFR BLD AUTO: 0.3 %
BILIRUB SERPL-MCNC: 0.6 MG/DL (ref 0–1.2)
BODY TEMPERATURE: 37 DEGREES CELSIUS
BODY TEMPERATURE: 37 DEGREES CELSIUS
BUN SERPL-MCNC: 29 MG/DL (ref 6–23)
CALCIUM SERPL-MCNC: 9.8 MG/DL (ref 8.6–10.3)
CHLORIDE SERPL-SCNC: 98 MMOL/L (ref 98–107)
CO2 SERPL-SCNC: 35 MMOL/L (ref 21–32)
CREAT SERPL-MCNC: 0.77 MG/DL (ref 0.5–1.3)
EGFRCR SERPLBLD CKD-EPI 2021: >90 ML/MIN/1.73M*2
EOSINOPHIL # BLD AUTO: 0.04 X10*3/UL (ref 0–0.4)
EOSINOPHIL NFR BLD AUTO: 0.3 %
ERYTHROCYTE [DISTWIDTH] IN BLOOD BY AUTOMATED COUNT: 13.3 % (ref 11.5–14.5)
FLUAV RNA RESP QL NAA+PROBE: DETECTED
FLUBV RNA RESP QL NAA+PROBE: NOT DETECTED
GLUCOSE SERPL-MCNC: 156 MG/DL (ref 74–99)
HCO3 BLDV-SCNC: 32.1 MMOL/L (ref 22–26)
HCO3 BLDV-SCNC: 36.3 MMOL/L (ref 22–26)
HCT VFR BLD AUTO: 42.2 % (ref 41–52)
HGB BLD-MCNC: 13.5 G/DL (ref 13.5–17.5)
IMM GRANULOCYTES # BLD AUTO: 0.3 X10*3/UL (ref 0–0.5)
IMM GRANULOCYTES NFR BLD AUTO: 2.5 % (ref 0–0.9)
INHALED O2 CONCENTRATION: 0 %
INHALED O2 CONCENTRATION: 36 %
LYMPHOCYTES # BLD AUTO: 0.73 X10*3/UL (ref 0.8–3)
LYMPHOCYTES NFR BLD AUTO: 6 %
MAGNESIUM SERPL-MCNC: 1.73 MG/DL (ref 1.6–2.4)
MCH RBC QN AUTO: 30.5 PG (ref 26–34)
MCHC RBC AUTO-ENTMCNC: 32 G/DL (ref 32–36)
MCV RBC AUTO: 96 FL (ref 80–100)
MONOCYTES # BLD AUTO: 0.79 X10*3/UL (ref 0.05–0.8)
MONOCYTES NFR BLD AUTO: 6.5 %
NEUTROPHILS # BLD AUTO: 10.25 X10*3/UL (ref 1.6–5.5)
NEUTROPHILS NFR BLD AUTO: 84.4 %
NRBC BLD-RTO: 0 /100 WBCS (ref 0–0)
OXYHGB MFR BLDV: 59.1 % (ref 45–75)
OXYHGB MFR BLDV: 86.9 % (ref 45–75)
PCO2 BLDV: 53 MM HG (ref 41–51)
PCO2 BLDV: 72 MM HG (ref 41–51)
PH BLDV: 7.31 PH (ref 7.33–7.43)
PH BLDV: 7.39 PH (ref 7.33–7.43)
PLATELET # BLD AUTO: 270 X10*3/UL (ref 150–450)
PO2 BLDV: 38 MM HG (ref 35–45)
PO2 BLDV: 57 MM HG (ref 35–45)
POTASSIUM SERPL-SCNC: 4.3 MMOL/L (ref 3.5–5.3)
PROT SERPL-MCNC: 7.6 G/DL (ref 6.4–8.2)
RBC # BLD AUTO: 4.42 X10*6/UL (ref 4.5–5.9)
SAO2 % BLDV: 63 % (ref 45–75)
SAO2 % BLDV: 89 % (ref 45–75)
SARS-COV-2 RNA RESP QL NAA+PROBE: NOT DETECTED
SODIUM SERPL-SCNC: 143 MMOL/L (ref 136–145)
WBC # BLD AUTO: 12.2 X10*3/UL (ref 4.4–11.3)

## 2025-03-06 PROCEDURE — 71275 CT ANGIOGRAPHY CHEST: CPT

## 2025-03-06 PROCEDURE — 93010 ELECTROCARDIOGRAM REPORT: CPT | Performed by: INTERNAL MEDICINE

## 2025-03-06 PROCEDURE — 87636 SARSCOV2 & INF A&B AMP PRB: CPT | Performed by: STUDENT IN AN ORGANIZED HEALTH CARE EDUCATION/TRAINING PROGRAM

## 2025-03-06 PROCEDURE — 82247 BILIRUBIN TOTAL: CPT | Performed by: STUDENT IN AN ORGANIZED HEALTH CARE EDUCATION/TRAINING PROGRAM

## 2025-03-06 PROCEDURE — 96365 THER/PROPH/DIAG IV INF INIT: CPT

## 2025-03-06 PROCEDURE — 80053 COMPREHEN METABOLIC PANEL: CPT | Performed by: STUDENT IN AN ORGANIZED HEALTH CARE EDUCATION/TRAINING PROGRAM

## 2025-03-06 PROCEDURE — 36415 COLL VENOUS BLD VENIPUNCTURE: CPT | Performed by: STUDENT IN AN ORGANIZED HEALTH CARE EDUCATION/TRAINING PROGRAM

## 2025-03-06 PROCEDURE — 71275 CT ANGIOGRAPHY CHEST: CPT | Performed by: RADIOLOGY

## 2025-03-06 PROCEDURE — 82805 BLOOD GASES W/O2 SATURATION: CPT | Performed by: STUDENT IN AN ORGANIZED HEALTH CARE EDUCATION/TRAINING PROGRAM

## 2025-03-06 PROCEDURE — 2550000001 HC RX 255 CONTRASTS: Performed by: STUDENT IN AN ORGANIZED HEALTH CARE EDUCATION/TRAINING PROGRAM

## 2025-03-06 PROCEDURE — 99291 CRITICAL CARE FIRST HOUR: CPT | Mod: 25 | Performed by: STUDENT IN AN ORGANIZED HEALTH CARE EDUCATION/TRAINING PROGRAM

## 2025-03-06 PROCEDURE — 99285 EMERGENCY DEPT VISIT HI MDM: CPT | Mod: 25 | Performed by: STUDENT IN AN ORGANIZED HEALTH CARE EDUCATION/TRAINING PROGRAM

## 2025-03-06 PROCEDURE — 94640 AIRWAY INHALATION TREATMENT: CPT

## 2025-03-06 PROCEDURE — 2060000001 HC INTERMEDIATE ICU ROOM DAILY

## 2025-03-06 PROCEDURE — 71045 X-RAY EXAM CHEST 1 VIEW: CPT

## 2025-03-06 PROCEDURE — 71045 X-RAY EXAM CHEST 1 VIEW: CPT | Performed by: RADIOLOGY

## 2025-03-06 PROCEDURE — 2500000005 HC RX 250 GENERAL PHARMACY W/O HCPCS: Performed by: STUDENT IN AN ORGANIZED HEALTH CARE EDUCATION/TRAINING PROGRAM

## 2025-03-06 PROCEDURE — 5A09357 ASSISTANCE WITH RESPIRATORY VENTILATION, LESS THAN 24 CONSECUTIVE HOURS, CONTINUOUS POSITIVE AIRWAY PRESSURE: ICD-10-PCS | Performed by: INTERNAL MEDICINE

## 2025-03-06 PROCEDURE — 85025 COMPLETE CBC W/AUTO DIFF WBC: CPT | Performed by: STUDENT IN AN ORGANIZED HEALTH CARE EDUCATION/TRAINING PROGRAM

## 2025-03-06 PROCEDURE — 94660 CPAP INITIATION&MGMT: CPT

## 2025-03-06 PROCEDURE — 83735 ASSAY OF MAGNESIUM: CPT | Performed by: STUDENT IN AN ORGANIZED HEALTH CARE EDUCATION/TRAINING PROGRAM

## 2025-03-06 PROCEDURE — 93005 ELECTROCARDIOGRAM TRACING: CPT

## 2025-03-06 PROCEDURE — 2500000002 HC RX 250 W HCPCS SELF ADMINISTERED DRUGS (ALT 637 FOR MEDICARE OP, ALT 636 FOR OP/ED): Performed by: STUDENT IN AN ORGANIZED HEALTH CARE EDUCATION/TRAINING PROGRAM

## 2025-03-06 PROCEDURE — 2500000004 HC RX 250 GENERAL PHARMACY W/ HCPCS (ALT 636 FOR OP/ED): Performed by: STUDENT IN AN ORGANIZED HEALTH CARE EDUCATION/TRAINING PROGRAM

## 2025-03-06 PROCEDURE — 99223 1ST HOSP IP/OBS HIGH 75: CPT | Performed by: INTERNAL MEDICINE

## 2025-03-06 RX ORDER — ATORVASTATIN CALCIUM 20 MG/1
20 TABLET, FILM COATED ORAL DAILY
Status: DISPENSED | OUTPATIENT
Start: 2025-03-07

## 2025-03-06 RX ORDER — ALBUTEROL SULFATE 0.83 MG/ML
2.5 SOLUTION RESPIRATORY (INHALATION) EVERY 20 MIN
Status: COMPLETED | OUTPATIENT
Start: 2025-03-06 | End: 2025-03-06

## 2025-03-06 RX ORDER — OSELTAMIVIR PHOSPHATE 75 MG/1
75 CAPSULE ORAL 2 TIMES DAILY
Status: DISPENSED | OUTPATIENT
Start: 2025-03-07 | End: 2025-03-12

## 2025-03-06 RX ORDER — PANTOPRAZOLE SODIUM 40 MG/10ML
40 INJECTION, POWDER, LYOPHILIZED, FOR SOLUTION INTRAVENOUS
Status: DISPENSED | OUTPATIENT
Start: 2025-03-07

## 2025-03-06 RX ORDER — OSELTAMIVIR PHOSPHATE 75 MG/1
75 CAPSULE ORAL ONCE
Status: COMPLETED | OUTPATIENT
Start: 2025-03-06 | End: 2025-03-06

## 2025-03-06 RX ORDER — INSULIN LISPRO 100 [IU]/ML
0-10 INJECTION, SOLUTION INTRAVENOUS; SUBCUTANEOUS
Status: DISPENSED | OUTPATIENT
Start: 2025-03-07

## 2025-03-06 RX ORDER — FLUTICASONE FUROATE, UMECLIDINIUM BROMIDE AND VILANTEROL TRIFENATATE 100; 62.5; 25 UG/1; UG/1; UG/1
1 POWDER RESPIRATORY (INHALATION) DAILY
Status: ON HOLD | COMMUNITY

## 2025-03-06 RX ORDER — DEXTROSE 50 % IN WATER (D50W) INTRAVENOUS SYRINGE
25
Status: ACTIVE | OUTPATIENT
Start: 2025-03-06

## 2025-03-06 RX ORDER — SULFASALAZINE 500 MG/1
500 TABLET ORAL DAILY
Qty: 90 TABLET | Refills: 3 | Status: ON HOLD | OUTPATIENT
Start: 2025-03-06

## 2025-03-06 RX ORDER — LISINOPRIL 10 MG/1
10 TABLET ORAL DAILY
Status: DISPENSED | OUTPATIENT
Start: 2025-03-07

## 2025-03-06 RX ORDER — AMLODIPINE BESYLATE 5 MG/1
5 TABLET ORAL DAILY
Status: DISPENSED | OUTPATIENT
Start: 2025-03-07

## 2025-03-06 RX ORDER — TAMSULOSIN HYDROCHLORIDE 0.4 MG/1
0.4 CAPSULE ORAL DAILY
Status: DISPENSED | OUTPATIENT
Start: 2025-03-07

## 2025-03-06 RX ORDER — PANTOPRAZOLE SODIUM 40 MG/1
40 TABLET, DELAYED RELEASE ORAL
Status: DISPENSED | OUTPATIENT
Start: 2025-03-07

## 2025-03-06 RX ORDER — IPRATROPIUM BROMIDE AND ALBUTEROL SULFATE 2.5; .5 MG/3ML; MG/3ML
3 SOLUTION RESPIRATORY (INHALATION) EVERY 20 MIN
Status: COMPLETED | OUTPATIENT
Start: 2025-03-06 | End: 2025-03-06

## 2025-03-06 RX ORDER — ACETAMINOPHEN 160 MG/5ML
650 SOLUTION ORAL EVERY 4 HOURS PRN
Status: ACTIVE | OUTPATIENT
Start: 2025-03-06

## 2025-03-06 RX ORDER — PREDNISONE 20 MG/1
60 TABLET ORAL ONCE
Status: COMPLETED | OUTPATIENT
Start: 2025-03-06 | End: 2025-03-06

## 2025-03-06 RX ORDER — SULFASALAZINE 500 MG/1
500 TABLET ORAL DAILY
Status: DISPENSED | OUTPATIENT
Start: 2025-03-07

## 2025-03-06 RX ORDER — NAPROXEN SODIUM 220 MG/1
81 TABLET, FILM COATED ORAL DAILY
Status: DISPENSED | OUTPATIENT
Start: 2025-03-07

## 2025-03-06 RX ORDER — ACETAMINOPHEN 325 MG/1
650 TABLET ORAL EVERY 4 HOURS PRN
Status: ACTIVE | OUTPATIENT
Start: 2025-03-06

## 2025-03-06 RX ORDER — ENOXAPARIN SODIUM 100 MG/ML
40 INJECTION SUBCUTANEOUS DAILY
Status: DISPENSED | OUTPATIENT
Start: 2025-03-06

## 2025-03-06 RX ORDER — IPRATROPIUM BROMIDE AND ALBUTEROL SULFATE 2.5; .5 MG/3ML; MG/3ML
3 SOLUTION RESPIRATORY (INHALATION)
Status: DISCONTINUED | OUTPATIENT
Start: 2025-03-06 | End: 2025-03-07

## 2025-03-06 RX ORDER — IBUPROFEN 200 MG
1 TABLET ORAL DAILY
Status: DISPENSED | OUTPATIENT
Start: 2025-03-06

## 2025-03-06 RX ORDER — ACETAMINOPHEN 650 MG/1
650 SUPPOSITORY RECTAL EVERY 4 HOURS PRN
Status: ACTIVE | OUTPATIENT
Start: 2025-03-06

## 2025-03-06 RX ORDER — DEXTROSE 50 % IN WATER (D50W) INTRAVENOUS SYRINGE
12.5
Status: ACTIVE | OUTPATIENT
Start: 2025-03-06

## 2025-03-06 RX ORDER — FLUTICASONE FUROATE AND VILANTEROL 100; 25 UG/1; UG/1
1 POWDER RESPIRATORY (INHALATION) DAILY
Status: ACTIVE | OUTPATIENT
Start: 2025-03-07

## 2025-03-06 RX ADMIN — SODIUM CHLORIDE 1000 ML: 900 INJECTION, SOLUTION INTRAVENOUS at 17:28

## 2025-03-06 RX ADMIN — Medication 4 L/MIN: at 21:49

## 2025-03-06 RX ADMIN — IOHEXOL 75 ML: 350 INJECTION, SOLUTION INTRAVENOUS at 22:56

## 2025-03-06 RX ADMIN — ALBUTEROL SULFATE 2.5 MG: 2.5 SOLUTION RESPIRATORY (INHALATION) at 17:28

## 2025-03-06 RX ADMIN — PREDNISONE 60 MG: 20 TABLET ORAL at 17:28

## 2025-03-06 RX ADMIN — ALBUTEROL SULFATE 2.5 MG: 2.5 SOLUTION RESPIRATORY (INHALATION) at 17:52

## 2025-03-06 RX ADMIN — IPRATROPIUM BROMIDE AND ALBUTEROL SULFATE 3 ML: 2.5; .5 SOLUTION RESPIRATORY (INHALATION) at 17:28

## 2025-03-06 RX ADMIN — OSELTAMAVIR PHOSPHATE 75 MG: 75 CAPSULE ORAL at 21:57

## 2025-03-06 RX ADMIN — Medication 10.2 L/MIN: at 20:18

## 2025-03-06 RX ADMIN — DOXYCYCLINE 100 MG: 100 INJECTION, POWDER, LYOPHILIZED, FOR SOLUTION INTRAVENOUS at 17:28

## 2025-03-06 RX ADMIN — IPRATROPIUM BROMIDE AND ALBUTEROL SULFATE 3 ML: 2.5; .5 SOLUTION RESPIRATORY (INHALATION) at 17:52

## 2025-03-06 RX ADMIN — Medication 50 PERCENT: at 17:51

## 2025-03-06 RX ADMIN — ALBUTEROL SULFATE 2.5 MG: 2.5 SOLUTION RESPIRATORY (INHALATION) at 17:53

## 2025-03-06 ASSESSMENT — ENCOUNTER SYMPTOMS
UNEXPECTED WEIGHT CHANGE: 1
PSYCHIATRIC NEGATIVE: 1
HEMATOLOGIC/LYMPHATIC NEGATIVE: 1
NEUROLOGICAL NEGATIVE: 1
GASTROINTESTINAL NEGATIVE: 1
CARDIOVASCULAR NEGATIVE: 1
MUSCULOSKELETAL NEGATIVE: 1
ALLERGIC/IMMUNOLOGIC NEGATIVE: 1
ENDOCRINE NEGATIVE: 1
SHORTNESS OF BREATH: 1
EYES NEGATIVE: 1

## 2025-03-06 ASSESSMENT — PAIN - FUNCTIONAL ASSESSMENT: PAIN_FUNCTIONAL_ASSESSMENT: 0-10

## 2025-03-06 ASSESSMENT — COLUMBIA-SUICIDE SEVERITY RATING SCALE - C-SSRS
6. HAVE YOU EVER DONE ANYTHING, STARTED TO DO ANYTHING, OR PREPARED TO DO ANYTHING TO END YOUR LIFE?: NO
2. HAVE YOU ACTUALLY HAD ANY THOUGHTS OF KILLING YOURSELF?: NO
1. IN THE PAST MONTH, HAVE YOU WISHED YOU WERE DEAD OR WISHED YOU COULD GO TO SLEEP AND NOT WAKE UP?: NO

## 2025-03-06 ASSESSMENT — PAIN SCALES - GENERAL: PAINLEVEL_OUTOF10: 0 - NO PAIN

## 2025-03-06 NOTE — ED PROVIDER NOTES
"HPI   Chief Complaint   Patient presents with    Shortness of Breath     Patient stated that he has been feeling ill; sickness has been going through his house; covid, RSV and pnemonia. He refused a breathing tx en route with EMS. He has a hx of COPD and typically wears 2-3L NC at all times.        Patient presents to the ED for shortness of breath for the last few days.  No symptoms of progressive getting worse.  Notes numerous sick contacts within his home.  Notes he does not wear home oxygen as able prescribed.  Notes minimally productive cough of clearish phlegm.  States still smokes tobacco 0.5-1 PPD.  Denies any retrosternal pleuritic chest pain.  Denies any bilateral/unilateral leg swelling.  Does not appreciate fever/chills.  Notes undetermined weight loss over the last 2 months but denies any history of cancer.  Notes no changes in dietary intake.  Denies any changes in bowel/bladder habits.  Denies any other significant systemic symptoms or complaints.              Patient History   Past Medical History:   Diagnosis Date    COPD (chronic obstructive pulmonary disease) (Multi)     Diabetes mellitus (Multi)     Hypertension      Past Surgical History:   Procedure Laterality Date    MR HEAD ANGIO WO IV CONTRAST  2/26/2015    MR HEAD ANGIO WO IV CONTRAST [facility]     Family History   Problem Relation Name Age of Onset    Clotting disorder Mother      Other (overdose) Father       Social History     Tobacco Use    Smoking status: Every Day     Current packs/day: 1.00     Average packs/day: 1 pack/day for 60.0 years (60.0 ttl pk-yrs)     Types: Cigarettes    Smokeless tobacco: Never   Vaping Use    Vaping status: Never Used   Substance Use Topics    Alcohol use: Not Currently    Drug use: Not Currently     Types: Marijuana       Physical Exam   /65 (BP Location: Left arm, Patient Position: Lying)   Pulse 86   Temp 36.8 °C (98.2 °F) (Temporal)   Resp 18   Ht 1.651 m (5' 5\")   Wt 45.2 kg (99 " lb 10.4 oz)   SpO2 95%   BMI 16.58 kg/m²       Physical Exam  Vitals and nursing note reviewed.   Constitutional:       General: He is in acute distress.      Appearance: Normal appearance. He is underweight. He is ill-appearing. He is not toxic-appearing.      Comments: Mild distress secondary symptoms, chronically ill in appearance, does not appear toxic at this time   HENT:      Mouth/Throat:      Mouth: Mucous membranes are moist.      Pharynx: Oropharynx is clear.   Eyes:      Extraocular Movements: Extraocular movements intact.      Pupils: Pupils are equal, round, and reactive to light.   Cardiovascular:      Rate and Rhythm: Regular rhythm. Bradycardia present.      Pulses: Normal pulses.           Radial pulses are 2+ on the right side and 2+ on the left side.        Dorsalis pedis pulses are 2+ on the right side and 2+ on the left side.      Heart sounds: Normal heart sounds. Heart sounds not distant. No murmur heard.     Comments: Equal and symmetrical distal pulses  Pulmonary:      Effort: Pulmonary effort is normal. No respiratory distress.      Breath sounds: No decreased air movement. Examination of the right-upper field reveals wheezing. Examination of the left-upper field reveals wheezing. Examination of the right-middle field reveals wheezing. Examination of the left-middle field reveals wheezing. Examination of the right-lower field reveals wheezing. Examination of the left-lower field reveals wheezing. Wheezing present. No decreased breath sounds.      Comments: Speaking near complete sentences, mild conversational dyspnea, mild respiratory distress, tachypnea, diffuse expiratory wheezing bilaterally without any significant deep diminished breath sounds, placed on supplemental O2 secondary to hypoxia  Chest:      Chest wall: No tenderness.   Musculoskeletal:      Right lower leg: No edema.      Left lower leg: No edema.   Skin:     General: Skin is warm and dry.      Coloration: Skin is not  ashen, cyanotic or pale.   Neurological:      General: No focal deficit present.      Mental Status: He is alert and oriented to person, place, and time.           ED Course & MDM   ED Course as of 03/08/25 1101   u Mar 06, 2025   1637 VS notable for mild tachycardia and mild tachypnea on presentation in setting reported shortness of breath with known sick contact remaining VSS [BC]   1638 I personally reviewed and interpreted the EKG @ 1637: , no appreciable ischemia, LAD, LAFB, prolonged Qtc 486 ms, and no prior EKG available for review [BC]   1715 CBC and Auto Differential(!)  Leukocytosis with prominent neutrophilia in the setting of shortness of breath, otherwise unremarkable and noncontributory to Patient condition/symptoms [BC]   1720 Blood Gas Venous(!!)  Moderate significant respiratory acidosis consistent with COPD exacerbation [BC]   1800 Sars-CoV-2 and Influenza A/B PCR(!)  Influenza A positive in setting of COPD exacerbation [BC]   1801 XR chest 1 view  IMPRESSION:  1.  Small bilateral pleural effusions.    I have personally reviewed and interpreted the images, severe emphysematous changes, no cardiomegaly or pulmonary vascular congestion or significant opacities, trace bibasilar pleural effusions, agree with radiology final read [BC]   2001 Comprehensive metabolic panel(!)  Unremarkable and noncontributory to Patient condition/symptoms [BC]   2002 Magnesium  WNL [BC]      ED Course User Index  [BC] Kojo Calixto MD         Diagnoses as of 03/08/25 1101   Influenza A   Acute respiratory distress   Acute hypoxic respiratory failure (Multi)   COPD exacerbation (Multi)   Leukocytosis, unspecified type   Respiratory acidosis                 No data recorded     [place] Coma Scale Score: 15 (03/07/25 2000 : Jorge Rodney, JACK)                           Medical Decision Making  Patient presented to the ED for shortness of breath with productive cough last few days and sick contacts with  concerning PMHx of COPD, HTN, DM 2, oxygen dependent, HLD, PVD.  I personally reviewed and interpreted VS, labs, images, and EKG which are as stated above in the ED course.    Assessment/evaluation consistent with likely influenza A viral syndrome/pneumonia complicated by hypoxia and mild to moderate COPD exacerbation requiring NIV supplemental O2 and ventilatory support.  No concerning history, clinical evidence/work-up, or exam findings for the considered differentials of COVID viral syndrome, ACS/MI, acute HF/fluid overload, PTX, focal/multifocal lobar PNA.  These conditions have been thoroughly evaluated and determined to be sufficiently unlikely to be the etiology of patient's presenting symptoms.    After receiving an appropriate exam, clinical work-up, and necessary interventions/treatment, Patient is appropriate for [facility] (telemetry) at this time due to concern for acute decompensation, further interrogation/management of symptoms, and unable to care for self or manage presenting symptoms/conditions at home/living situation.  Patient was encouraged to ask any questions or for clarification of today's ED encounter.  Patient is agreeable to plan of care.       Per Chart Review: Pulmonology office visit on 11/18/2024, visit summary significant for evaluation of lung nodules with history of COPD and cough, evaluation of pulmonary status along with reported weight loss, VSS, exam unremarkable/noncontributory, plan for updated labs, added Trelegy, will obtain updated imaging, continue establish medical management chronic remedies, follow-up with results.      Parts of this chart have been completed using voice-to-text recognition software. Please excuse any errors of transcription that were missed for editing/correcting.    Amount and/or Complexity of Data Reviewed  External Data Reviewed: notes.     Details: See [facility]  Labs: ordered. Decision-making details documented in ED Course.  Radiology:  ordered and independent interpretation performed. Decision-making details documented in ED Course.  ECG/medicine tests: ordered and independent interpretation performed. Decision-making details documented in ED Course.        Procedure  Procedures     Kojo Calixto MD  03/08/25 0705

## 2025-03-07 PROBLEM — J15.9 BACTERIAL PNEUMONIA: Status: ACTIVE | Noted: 2025-03-07

## 2025-03-07 LAB
ABO GROUP (TYPE) IN BLOOD: NORMAL
ALBUMIN SERPL BCP-MCNC: 2.9 G/DL (ref 3.4–5)
ALP SERPL-CCNC: 54 U/L (ref 33–136)
ALT SERPL W P-5'-P-CCNC: 14 U/L (ref 10–52)
ANION GAP SERPL CALCULATED.3IONS-SCNC: 11 MMOL/L (ref 10–20)
ANTIBODY SCREEN: NORMAL
APPEARANCE UR: CLEAR
AST SERPL W P-5'-P-CCNC: 13 U/L (ref 9–39)
BILIRUB SERPL-MCNC: 0.4 MG/DL (ref 0–1.2)
BILIRUB UR STRIP.AUTO-MCNC: NEGATIVE MG/DL
BNP SERPL-MCNC: 168 PG/ML (ref 0–99)
BUN SERPL-MCNC: 25 MG/DL (ref 6–23)
CALCIUM SERPL-MCNC: 8.8 MG/DL (ref 8.6–10.3)
CARDIAC TROPONIN I PNL SERPL HS: 15 NG/L (ref 0–20)
CARDIAC TROPONIN I PNL SERPL HS: 15 NG/L (ref 0–20)
CHLORIDE SERPL-SCNC: 104 MMOL/L (ref 98–107)
CO2 SERPL-SCNC: 34 MMOL/L (ref 21–32)
COLOR UR: YELLOW
CREAT SERPL-MCNC: 0.57 MG/DL (ref 0.5–1.3)
EGFRCR SERPLBLD CKD-EPI 2021: >90 ML/MIN/1.73M*2
ERYTHROCYTE [DISTWIDTH] IN BLOOD BY AUTOMATED COUNT: 13.5 % (ref 11.5–14.5)
EST. AVERAGE GLUCOSE BLD GHB EST-MCNC: 151 MG/DL
GLUCOSE BLD MANUAL STRIP-MCNC: 137 MG/DL (ref 74–99)
GLUCOSE BLD MANUAL STRIP-MCNC: 250 MG/DL (ref 74–99)
GLUCOSE BLD MANUAL STRIP-MCNC: 257 MG/DL (ref 74–99)
GLUCOSE BLD MANUAL STRIP-MCNC: 384 MG/DL (ref 74–99)
GLUCOSE BLD MANUAL STRIP-MCNC: 404 MG/DL (ref 74–99)
GLUCOSE SERPL-MCNC: 147 MG/DL (ref 74–99)
GLUCOSE UR STRIP.AUTO-MCNC: NORMAL MG/DL
HBA1C MFR BLD: 6.9 %
HCT VFR BLD AUTO: 36.8 % (ref 41–52)
HGB BLD-MCNC: 11.8 G/DL (ref 13.5–17.5)
INR PPP: 1 (ref 0.9–1.2)
KETONES UR STRIP.AUTO-MCNC: NEGATIVE MG/DL
LEGIONELLA AG UR QL: NEGATIVE
LEUKOCYTE ESTERASE UR QL STRIP.AUTO: NEGATIVE
MAGNESIUM SERPL-MCNC: 1.57 MG/DL (ref 1.6–2.4)
MCH RBC QN AUTO: 30.5 PG (ref 26–34)
MCHC RBC AUTO-ENTMCNC: 32.1 G/DL (ref 32–36)
MCV RBC AUTO: 95 FL (ref 80–100)
MUCOUS THREADS #/AREA URNS AUTO: NORMAL /LPF
NITRITE UR QL STRIP.AUTO: NEGATIVE
NRBC BLD-RTO: 0 /100 WBCS (ref 0–0)
PH UR STRIP.AUTO: 5.5 [PH]
PLATELET # BLD AUTO: 248 X10*3/UL (ref 150–450)
POTASSIUM SERPL-SCNC: 4.8 MMOL/L (ref 3.5–5.3)
PROCALCITONIN SERPL-MCNC: 0.19 NG/ML
PROT SERPL-MCNC: 6.5 G/DL (ref 6.4–8.2)
PROT UR STRIP.AUTO-MCNC: ABNORMAL MG/DL
PROTHROMBIN TIME: 11.2 SECONDS (ref 9.3–12.7)
RBC # BLD AUTO: 3.87 X10*6/UL (ref 4.5–5.9)
RBC # UR STRIP.AUTO: NEGATIVE MG/DL
RBC #/AREA URNS AUTO: NORMAL /HPF
RH FACTOR (ANTIGEN D): NORMAL
S PNEUM AG UR QL: POSITIVE
SODIUM SERPL-SCNC: 144 MMOL/L (ref 136–145)
SP GR UR STRIP.AUTO: >1.05
UROBILINOGEN UR STRIP.AUTO-MCNC: NORMAL MG/DL
WBC # BLD AUTO: 14.5 X10*3/UL (ref 4.4–11.3)
WBC #/AREA URNS AUTO: NORMAL /HPF

## 2025-03-07 PROCEDURE — 83036 HEMOGLOBIN GLYCOSYLATED A1C: CPT | Mod: TRILAB | Performed by: INTERNAL MEDICINE

## 2025-03-07 PROCEDURE — 87899 AGENT NOS ASSAY W/OPTIC: CPT | Mod: TRILAB | Performed by: INTERNAL MEDICINE

## 2025-03-07 PROCEDURE — 2060000001 HC INTERMEDIATE ICU ROOM DAILY

## 2025-03-07 PROCEDURE — 83880 ASSAY OF NATRIURETIC PEPTIDE: CPT | Performed by: INTERNAL MEDICINE

## 2025-03-07 PROCEDURE — 82947 ASSAY GLUCOSE BLOOD QUANT: CPT

## 2025-03-07 PROCEDURE — 2500000005 HC RX 250 GENERAL PHARMACY W/O HCPCS: Performed by: STUDENT IN AN ORGANIZED HEALTH CARE EDUCATION/TRAINING PROGRAM

## 2025-03-07 PROCEDURE — 83735 ASSAY OF MAGNESIUM: CPT | Performed by: INTERNAL MEDICINE

## 2025-03-07 PROCEDURE — 2500000004 HC RX 250 GENERAL PHARMACY W/ HCPCS (ALT 636 FOR OP/ED): Performed by: INTERNAL MEDICINE

## 2025-03-07 PROCEDURE — 2500000002 HC RX 250 W HCPCS SELF ADMINISTERED DRUGS (ALT 637 FOR MEDICARE OP, ALT 636 FOR OP/ED): Performed by: INTERNAL MEDICINE

## 2025-03-07 PROCEDURE — 99232 SBSQ HOSP IP/OBS MODERATE 35: CPT | Performed by: HOSPITALIST

## 2025-03-07 PROCEDURE — 81003 URINALYSIS AUTO W/O SCOPE: CPT | Performed by: INTERNAL MEDICINE

## 2025-03-07 PROCEDURE — 94640 AIRWAY INHALATION TREATMENT: CPT

## 2025-03-07 PROCEDURE — 84075 ASSAY ALKALINE PHOSPHATASE: CPT | Performed by: INTERNAL MEDICINE

## 2025-03-07 PROCEDURE — 84484 ASSAY OF TROPONIN QUANT: CPT | Performed by: INTERNAL MEDICINE

## 2025-03-07 PROCEDURE — 85027 COMPLETE CBC AUTOMATED: CPT | Performed by: INTERNAL MEDICINE

## 2025-03-07 PROCEDURE — 86901 BLOOD TYPING SEROLOGIC RH(D): CPT | Performed by: INTERNAL MEDICINE

## 2025-03-07 PROCEDURE — 84145 PROCALCITONIN (PCT): CPT | Mod: TRILAB | Performed by: INTERNAL MEDICINE

## 2025-03-07 PROCEDURE — S4991 NICOTINE PATCH NONLEGEND: HCPCS | Performed by: INTERNAL MEDICINE

## 2025-03-07 PROCEDURE — 94762 N-INVAS EAR/PLS OXIMTRY CONT: CPT

## 2025-03-07 PROCEDURE — 86738 MYCOPLASMA ANTIBODY: CPT | Performed by: INTERNAL MEDICINE

## 2025-03-07 PROCEDURE — 2500000001 HC RX 250 WO HCPCS SELF ADMINISTERED DRUGS (ALT 637 FOR MEDICARE OP): Performed by: INTERNAL MEDICINE

## 2025-03-07 PROCEDURE — 87205 SMEAR GRAM STAIN: CPT | Mod: TRILAB | Performed by: INTERNAL MEDICINE

## 2025-03-07 PROCEDURE — 36415 COLL VENOUS BLD VENIPUNCTURE: CPT | Performed by: INTERNAL MEDICINE

## 2025-03-07 PROCEDURE — 85610 PROTHROMBIN TIME: CPT | Performed by: INTERNAL MEDICINE

## 2025-03-07 PROCEDURE — 81001 URINALYSIS AUTO W/SCOPE: CPT | Performed by: INTERNAL MEDICINE

## 2025-03-07 PROCEDURE — 87040 BLOOD CULTURE FOR BACTERIA: CPT | Mod: TRILAB | Performed by: INTERNAL MEDICINE

## 2025-03-07 PROCEDURE — 2500000004 HC RX 250 GENERAL PHARMACY W/ HCPCS (ALT 636 FOR OP/ED): Performed by: HOSPITALIST

## 2025-03-07 PROCEDURE — 9420000001 HC RT PATIENT EDUCATION 5 MIN

## 2025-03-07 PROCEDURE — 87449 NOS EACH ORGANISM AG IA: CPT | Mod: TRILAB | Performed by: INTERNAL MEDICINE

## 2025-03-07 RX ORDER — CEFTRIAXONE 2 G/50ML
2 INJECTION, SOLUTION INTRAVENOUS EVERY 24 HOURS
Status: DISPENSED | OUTPATIENT
Start: 2025-03-07

## 2025-03-07 RX ORDER — IPRATROPIUM BROMIDE AND ALBUTEROL SULFATE 2.5; .5 MG/3ML; MG/3ML
3 SOLUTION RESPIRATORY (INHALATION) EVERY 2 HOUR PRN
Status: ACTIVE | OUTPATIENT
Start: 2025-03-07

## 2025-03-07 RX ORDER — IPRATROPIUM BROMIDE AND ALBUTEROL SULFATE 2.5; .5 MG/3ML; MG/3ML
3 SOLUTION RESPIRATORY (INHALATION)
Status: DISCONTINUED | OUTPATIENT
Start: 2025-03-07 | End: 2025-03-09

## 2025-03-07 RX ORDER — INSULIN LISPRO 100 [IU]/ML
10 INJECTION, SOLUTION INTRAVENOUS; SUBCUTANEOUS ONCE
Status: COMPLETED | OUTPATIENT
Start: 2025-03-07 | End: 2025-03-07

## 2025-03-07 RX ADMIN — ASPIRIN 81 MG CHEWABLE TABLET 81 MG: 81 TABLET CHEWABLE at 10:35

## 2025-03-07 RX ADMIN — INSULIN LISPRO 6 UNITS: 100 INJECTION, SOLUTION INTRAVENOUS; SUBCUTANEOUS at 13:19

## 2025-03-07 RX ADMIN — Medication 4 L/MIN: at 19:06

## 2025-03-07 RX ADMIN — METHYLPREDNISOLONE SODIUM SUCCINATE 40 MG: 40 INJECTION, POWDER, FOR SOLUTION INTRAMUSCULAR; INTRAVENOUS at 01:08

## 2025-03-07 RX ADMIN — IPRATROPIUM BROMIDE AND ALBUTEROL SULFATE 3 ML: 2.5; .5 SOLUTION RESPIRATORY (INHALATION) at 08:25

## 2025-03-07 RX ADMIN — METHYLPREDNISOLONE SODIUM SUCCINATE 40 MG: 40 INJECTION, POWDER, FOR SOLUTION INTRAMUSCULAR; INTRAVENOUS at 21:06

## 2025-03-07 RX ADMIN — IPRATROPIUM BROMIDE AND ALBUTEROL SULFATE 3 ML: 2.5; .5 SOLUTION RESPIRATORY (INHALATION) at 15:57

## 2025-03-07 RX ADMIN — IPRATROPIUM BROMIDE AND ALBUTEROL SULFATE 3 ML: 2.5; .5 SOLUTION RESPIRATORY (INHALATION) at 18:40

## 2025-03-07 RX ADMIN — OSELTAMIVIR PHOSPHATE 75 MG: 75 CAPSULE ORAL at 10:35

## 2025-03-07 RX ADMIN — OSELTAMIVIR PHOSPHATE 75 MG: 75 CAPSULE ORAL at 20:20

## 2025-03-07 RX ADMIN — INSULIN LISPRO 4 UNITS: 100 INJECTION, SOLUTION INTRAVENOUS; SUBCUTANEOUS at 17:58

## 2025-03-07 RX ADMIN — TAMSULOSIN HYDROCHLORIDE 0.4 MG: 0.4 CAPSULE ORAL at 10:54

## 2025-03-07 RX ADMIN — ENOXAPARIN SODIUM 40 MG: 40 INJECTION SUBCUTANEOUS at 01:10

## 2025-03-07 RX ADMIN — DOXYCYCLINE 100 MG: 100 INJECTION, POWDER, LYOPHILIZED, FOR SOLUTION INTRAVENOUS at 21:06

## 2025-03-07 RX ADMIN — METHYLPREDNISOLONE SODIUM SUCCINATE 40 MG: 40 INJECTION, POWDER, FOR SOLUTION INTRAMUSCULAR; INTRAVENOUS at 06:15

## 2025-03-07 RX ADMIN — AMLODIPINE BESYLATE 5 MG: 5 TABLET ORAL at 10:38

## 2025-03-07 RX ADMIN — CEFTRIAXONE SODIUM 2 G: 2 INJECTION, SOLUTION INTRAVENOUS at 11:43

## 2025-03-07 RX ADMIN — METHYLPREDNISOLONE SODIUM SUCCINATE 40 MG: 40 INJECTION, POWDER, FOR SOLUTION INTRAMUSCULAR; INTRAVENOUS at 14:50

## 2025-03-07 RX ADMIN — ATORVASTATIN CALCIUM 20 MG: 20 TABLET, FILM COATED ORAL at 10:35

## 2025-03-07 RX ADMIN — Medication 14 L/MIN: at 08:25

## 2025-03-07 RX ADMIN — INSULIN LISPRO 10 UNITS: 100 INJECTION, SOLUTION INTRAVENOUS; SUBCUTANEOUS at 21:33

## 2025-03-07 RX ADMIN — NICOTINE 1 PATCH: 14 PATCH, EXTENDED RELEASE TRANSDERMAL at 01:10

## 2025-03-07 RX ADMIN — DOXYCYCLINE 100 MG: 100 INJECTION, POWDER, LYOPHILIZED, FOR SOLUTION INTRAVENOUS at 11:44

## 2025-03-07 RX ADMIN — Medication 4 L/MIN: at 08:26

## 2025-03-07 RX ADMIN — PANTOPRAZOLE SODIUM 40 MG: 40 INJECTION, POWDER, FOR SOLUTION INTRAVENOUS at 06:15

## 2025-03-07 RX ADMIN — SULFASALAZINE 500 MG: 500 TABLET ORAL at 10:35

## 2025-03-07 RX ADMIN — LISINOPRIL 10 MG: 10 TABLET ORAL at 10:35

## 2025-03-07 SDOH — SOCIAL STABILITY: SOCIAL INSECURITY: ARE YOU MARRIED, WIDOWED, DIVORCED, SEPARATED, NEVER MARRIED, OR LIVING WITH A PARTNER?: MARRIED

## 2025-03-07 SDOH — ECONOMIC STABILITY: FOOD INSECURITY: HOW HARD IS IT FOR YOU TO PAY FOR THE VERY BASICS LIKE FOOD, HOUSING, MEDICAL CARE, AND HEATING?: SOMEWHAT HARD

## 2025-03-07 SDOH — SOCIAL STABILITY: SOCIAL NETWORK
DO YOU BELONG TO ANY CLUBS OR ORGANIZATIONS SUCH AS CHURCH GROUPS, UNIONS, FRATERNAL OR ATHLETIC GROUPS, OR SCHOOL GROUPS?: NO

## 2025-03-07 SDOH — ECONOMIC STABILITY: FOOD INSECURITY: WITHIN THE PAST 12 MONTHS, THE FOOD YOU BOUGHT JUST DIDN'T LAST AND YOU DIDN'T HAVE MONEY TO GET MORE.: NEVER TRUE

## 2025-03-07 SDOH — ECONOMIC STABILITY: INCOME INSECURITY: IN THE PAST 12 MONTHS HAS THE ELECTRIC, GAS, OIL, OR WATER COMPANY THREATENED TO SHUT OFF SERVICES IN YOUR HOME?: NO

## 2025-03-07 SDOH — SOCIAL STABILITY: SOCIAL NETWORK
IN A TYPICAL WEEK, HOW MANY TIMES DO YOU TALK ON THE PHONE WITH FAMILY, FRIENDS, OR NEIGHBORS?: MORE THAN THREE TIMES A WEEK

## 2025-03-07 SDOH — SOCIAL STABILITY: SOCIAL INSECURITY: HAS ANYONE EVER THREATENED TO HURT YOUR FAMILY OR YOUR PETS?: NO

## 2025-03-07 SDOH — SOCIAL STABILITY: SOCIAL INSECURITY
WITHIN THE LAST YEAR, HAVE YOU BEEN RAPED OR FORCED TO HAVE ANY KIND OF SEXUAL ACTIVITY BY YOUR PARTNER OR EX-PARTNER?: NO

## 2025-03-07 SDOH — SOCIAL STABILITY: SOCIAL INSECURITY: WITHIN THE LAST YEAR, HAVE YOU BEEN HUMILIATED OR EMOTIONALLY ABUSED IN OTHER WAYS BY YOUR PARTNER OR EX-PARTNER?: NO

## 2025-03-07 SDOH — ECONOMIC STABILITY: FOOD INSECURITY: WITHIN THE PAST 12 MONTHS, YOU WORRIED THAT YOUR FOOD WOULD RUN OUT BEFORE YOU GOT THE MONEY TO BUY MORE.: NEVER TRUE

## 2025-03-07 SDOH — ECONOMIC STABILITY: HOUSING INSECURITY: AT ANY TIME IN THE PAST 12 MONTHS, WERE YOU HOMELESS OR LIVING IN A SHELTER (INCLUDING NOW)?: NO

## 2025-03-07 SDOH — HEALTH STABILITY: PHYSICAL HEALTH
HOW OFTEN DO YOU NEED TO HAVE SOMEONE HELP YOU WHEN YOU READ INSTRUCTIONS, PAMPHLETS, OR OTHER WRITTEN MATERIAL FROM YOUR DOCTOR OR PHARMACY?: RARELY

## 2025-03-07 SDOH — HEALTH STABILITY: PHYSICAL HEALTH: ON AVERAGE, HOW MANY DAYS PER WEEK DO YOU ENGAGE IN MODERATE TO STRENUOUS EXERCISE (LIKE A BRISK WALK)?: 0 DAYS

## 2025-03-07 SDOH — HEALTH STABILITY: MENTAL HEALTH: HOW OFTEN DO YOU HAVE SIX OR MORE DRINKS ON ONE OCCASION?: NEVER

## 2025-03-07 SDOH — HEALTH STABILITY: MENTAL HEALTH: HOW OFTEN DO YOU HAVE A DRINK CONTAINING ALCOHOL?: NEVER

## 2025-03-07 SDOH — SOCIAL STABILITY: SOCIAL INSECURITY
WITHIN THE LAST YEAR, HAVE YOU BEEN KICKED, HIT, SLAPPED, OR OTHERWISE PHYSICALLY HURT BY YOUR PARTNER OR EX-PARTNER?: NO

## 2025-03-07 SDOH — SOCIAL STABILITY: SOCIAL INSECURITY: DO YOU FEEL UNSAFE GOING BACK TO THE PLACE WHERE YOU ARE LIVING?: NO

## 2025-03-07 SDOH — ECONOMIC STABILITY: HOUSING INSECURITY: IN THE LAST 12 MONTHS, WAS THERE A TIME WHEN YOU WERE NOT ABLE TO PAY THE MORTGAGE OR RENT ON TIME?: NO

## 2025-03-07 SDOH — SOCIAL STABILITY: SOCIAL INSECURITY: HAVE YOU HAD THOUGHTS OF HARMING ANYONE ELSE?: NO

## 2025-03-07 SDOH — SOCIAL STABILITY: SOCIAL INSECURITY: DOES ANYONE TRY TO KEEP YOU FROM HAVING/CONTACTING OTHER FRIENDS OR DOING THINGS OUTSIDE YOUR HOME?: NO

## 2025-03-07 SDOH — SOCIAL STABILITY: SOCIAL NETWORK: HOW OFTEN DO YOU ATTEND CHURCH OR RELIGIOUS SERVICES?: NEVER

## 2025-03-07 SDOH — SOCIAL STABILITY: SOCIAL INSECURITY: WITHIN THE LAST YEAR, HAVE YOU BEEN AFRAID OF YOUR PARTNER OR EX-PARTNER?: NO

## 2025-03-07 SDOH — SOCIAL STABILITY: SOCIAL INSECURITY: HAVE YOU HAD ANY THOUGHTS OF HARMING ANYONE ELSE?: NO

## 2025-03-07 SDOH — HEALTH STABILITY: MENTAL HEALTH
DO YOU FEEL STRESS - TENSE, RESTLESS, NERVOUS, OR ANXIOUS, OR UNABLE TO SLEEP AT NIGHT BECAUSE YOUR MIND IS TROUBLED ALL THE TIME - THESE DAYS?: TO SOME EXTENT

## 2025-03-07 SDOH — SOCIAL STABILITY: SOCIAL NETWORK: HOW OFTEN DO YOU ATTEND MEETINGS OF THE CLUBS OR ORGANIZATIONS YOU BELONG TO?: NEVER

## 2025-03-07 SDOH — SOCIAL STABILITY: SOCIAL INSECURITY: ARE YOU OR HAVE YOU BEEN THREATENED OR ABUSED PHYSICALLY, EMOTIONALLY, OR SEXUALLY BY ANYONE?: NO

## 2025-03-07 SDOH — HEALTH STABILITY: MENTAL HEALTH: HOW MANY DRINKS CONTAINING ALCOHOL DO YOU HAVE ON A TYPICAL DAY WHEN YOU ARE DRINKING?: PATIENT DOES NOT DRINK

## 2025-03-07 SDOH — HEALTH STABILITY: PHYSICAL HEALTH: ON AVERAGE, HOW MANY MINUTES DO YOU ENGAGE IN EXERCISE AT THIS LEVEL?: 0 MIN

## 2025-03-07 SDOH — SOCIAL STABILITY: SOCIAL NETWORK: HOW OFTEN DO YOU GET TOGETHER WITH FRIENDS OR RELATIVES?: MORE THAN THREE TIMES A WEEK

## 2025-03-07 SDOH — SOCIAL STABILITY: SOCIAL INSECURITY: ARE THERE ANY APPARENT SIGNS OF INJURIES/BEHAVIORS THAT COULD BE RELATED TO ABUSE/NEGLECT?: NO

## 2025-03-07 SDOH — SOCIAL STABILITY: SOCIAL INSECURITY: DO YOU FEEL ANYONE HAS EXPLOITED OR TAKEN ADVANTAGE OF YOU FINANCIALLY OR OF YOUR PERSONAL PROPERTY?: NO

## 2025-03-07 SDOH — ECONOMIC STABILITY: HOUSING INSECURITY: IN THE PAST 12 MONTHS, HOW MANY TIMES HAVE YOU MOVED WHERE YOU WERE LIVING?: 0

## 2025-03-07 SDOH — SOCIAL STABILITY: SOCIAL INSECURITY

## 2025-03-07 SDOH — ECONOMIC STABILITY: TRANSPORTATION INSECURITY: IN THE PAST 12 MONTHS, HAS LACK OF TRANSPORTATION KEPT YOU FROM MEDICAL APPOINTMENTS OR FROM GETTING MEDICATIONS?: NO

## 2025-03-07 ASSESSMENT — ACTIVITIES OF DAILY LIVING (ADL)
JUDGMENT_ADEQUATE_SAFELY_COMPLETE_DAILY_ACTIVITIES: YES
LACK_OF_TRANSPORTATION: NO
HEARING - RIGHT EAR: FUNCTIONAL
HEARING - LEFT EAR: FUNCTIONAL
BATHING: NEEDS ASSISTANCE
FEEDING YOURSELF: INDEPENDENT
JUDGMENT_ADEQUATE_SAFELY_COMPLETE_DAILY_ACTIVITIES: YES
DRESSING YOURSELF: NEEDS ASSISTANCE
GROOMING: INDEPENDENT
GROOMING: INDEPENDENT
PATIENT'S MEMORY ADEQUATE TO SAFELY COMPLETE DAILY ACTIVITIES?: YES
LACK_OF_TRANSPORTATION: NO
ASSISTIVE_DEVICE: WALKER
BATHING: NEEDS ASSISTANCE
WALKS IN HOME: NEEDS ASSISTANCE
WALKS IN HOME: NEEDS ASSISTANCE
HEARING - RIGHT EAR: FUNCTIONAL
FEEDING YOURSELF: INDEPENDENT
LACK_OF_TRANSPORTATION: NO
TOILETING: INDEPENDENT
DRESSING YOURSELF: NEEDS ASSISTANCE
ADEQUATE_TO_COMPLETE_ADL: YES
ADEQUATE_TO_COMPLETE_ADL: YES
TOILETING: INDEPENDENT
ASSISTIVE_DEVICE: WALKER
HEARING - LEFT EAR: FUNCTIONAL

## 2025-03-07 ASSESSMENT — PAIN SCALES - WONG BAKER: WONGBAKER_NUMERICALRESPONSE: NO HURT

## 2025-03-07 ASSESSMENT — PATIENT HEALTH QUESTIONNAIRE - PHQ9
SUM OF ALL RESPONSES TO PHQ9 QUESTIONS 1 & 2: 0
2. FEELING DOWN, DEPRESSED OR HOPELESS: NOT AT ALL
1. LITTLE INTEREST OR PLEASURE IN DOING THINGS: NOT AT ALL

## 2025-03-07 ASSESSMENT — COGNITIVE AND FUNCTIONAL STATUS - GENERAL
CLIMB 3 TO 5 STEPS WITH RAILING: A LOT
WALKING IN HOSPITAL ROOM: TOTAL
WALKING IN HOSPITAL ROOM: A LITTLE
MOBILITY SCORE: 19
DRESSING REGULAR UPPER BODY CLOTHING: A LITTLE
DAILY ACTIVITIY SCORE: 21
MOVING TO AND FROM BED TO CHAIR: TOTAL
TURNING FROM BACK TO SIDE WHILE IN FLAT BAD: A LOT
DAILY ACTIVITIY SCORE: 21
MOBILITY SCORE: 10
WALKING IN HOSPITAL ROOM: A LITTLE
DRESSING REGULAR LOWER BODY CLOTHING: A LITTLE
MOVING TO AND FROM BED TO CHAIR: A LITTLE
HELP NEEDED FOR BATHING: A LITTLE
CLIMB 3 TO 5 STEPS WITH RAILING: A LOT
HELP NEEDED FOR BATHING: A LITTLE
DRESSING REGULAR UPPER BODY CLOTHING: A LITTLE
DRESSING REGULAR LOWER BODY CLOTHING: A LITTLE
STANDING UP FROM CHAIR USING ARMS: A LITTLE
MOBILITY SCORE: 19
PATIENT BASELINE BEDBOUND: NO
STANDING UP FROM CHAIR USING ARMS: TOTAL
DRESSING REGULAR UPPER BODY CLOTHING: A LITTLE
CLIMB 3 TO 5 STEPS WITH RAILING: TOTAL
DRESSING REGULAR LOWER BODY CLOTHING: A LITTLE
DAILY ACTIVITIY SCORE: 21
MOVING TO AND FROM BED TO CHAIR: A LITTLE
STANDING UP FROM CHAIR USING ARMS: A LITTLE
HELP NEEDED FOR BATHING: A LITTLE

## 2025-03-07 ASSESSMENT — PAIN SCALES - GENERAL
PAINLEVEL_OUTOF10: 0 - NO PAIN
PAINLEVEL_OUTOF10: 0 - NO PAIN

## 2025-03-07 ASSESSMENT — LIFESTYLE VARIABLES
AUDIT-C TOTAL SCORE: 0
HOW OFTEN DO YOU HAVE A DRINK CONTAINING ALCOHOL: NEVER
SKIP TO QUESTIONS 9-10: 1
SKIP TO QUESTIONS 9-10: 1
AUDIT-C TOTAL SCORE: 0
HOW MANY STANDARD DRINKS CONTAINING ALCOHOL DO YOU HAVE ON A TYPICAL DAY: PATIENT DOES NOT DRINK
AUDIT-C TOTAL SCORE: 0
PRESCIPTION_ABUSE_PAST_12_MONTHS: NO
SUBSTANCE_ABUSE_PAST_12_MONTHS: NO
HOW OFTEN DO YOU HAVE 6 OR MORE DRINKS ON ONE OCCASION: NEVER

## 2025-03-07 NOTE — CARE PLAN
Problem: Respiratory  Goal: Clear secretions with interventions this shift  3/7/2025 1657 by Efrain Porter RN  Outcome: Progressing  Flowsheets (Taken 3/7/2025 1657)  Clear secretions with interventions this shift:   Encourage/provide pulmonary hygiene/secretion clearance   Med administration/monitoring of effect  3/7/2025 1657 by Efrain Porter RN  Outcome: Progressing  Flowsheets (Taken 3/7/2025 1657)  Clear secretions with interventions this shift:   Encourage/provide pulmonary hygiene/secretion clearance   Med administration/monitoring of effect  Goal: Minimize anxiety/maximize coping throughout shift  3/7/2025 1657 by Efrain Porter RN  Outcome: Progressing  Flowsheets (Taken 3/7/2025 1657)  Minimize anxiety/maximize coping throughout shift: Med administration/monitoring of effect  3/7/2025 1657 by Efrain Porter RN  Outcome: Progressing  Flowsheets (Taken 3/7/2025 1657)  Minimize anxiety/maximize coping throughout shift: Med administration/monitoring of effect  Goal: Minimal/no exertional discomfort or dyspnea this shift  3/7/2025 1657 by Efrain Porter RN  Outcome: Progressing  Flowsheets (Taken 3/7/2025 1657)  Minimal/no exertional discomfort or dyspnea this shift: Positioning to promote ventilation/comfort  3/7/2025 1657 by Efrain Porter RN  Outcome: Progressing  Flowsheets (Taken 3/7/2025 1657)  Minimal/no exertional discomfort or dyspnea this shift: Positioning to promote ventilation/comfort  Goal: No signs of respiratory distress (eg. Use of accessory muscles. Peds grunting)  Outcome: Progressing  Goal: Verbalize decreased shortness of breath this shift  3/7/2025 1657 by Efrain Porter RN  Outcome: Progressing  Flowsheets (Taken 3/7/2025 1657)  Verbalize decreased shortness of breath this shift: Encourage/provide pulmonary hygiene/secretion clearance  3/7/2025 1657 by Efrain Porter RN  Outcome: Progressing  Flowsheets (Taken 3/7/2025 1657)  Verbalize decreased  shortness of breath this shift: Encourage/provide pulmonary hygiene/secretion clearance  Goal: Wean oxygen to maintain O2 saturation per order/standard this shift  3/7/2025 1657 by Efrain Porter RN  Outcome: Progressing  Flowsheets (Taken 3/7/2025 1657)  Wean oxygen to maintain O2 saturation per order/standard this shift: Encourage activity/mobility  3/7/2025 1657 by Efrain Porter RN  Outcome: Progressing  Flowsheets (Taken 3/7/2025 1657)  Wean oxygen to maintain O2 saturation per order/standard this shift: Encourage activity/mobility  Goal: Increase self care and/or family involvement in next 24 hours  3/7/2025 1657 by Efrain Porter RN  Outcome: Progressing  Flowsheets (Taken 3/7/2025 1657)  Increase self care and/or family involvement in next 24 hours: Asthma home management plan  3/7/2025 1657 by Efrain Porter RN  Outcome: Progressing  Flowsheets (Taken 3/7/2025 1657)  Increase self care and/or family involvement in next 24 hours: Asthma home management plan   The patient's goals for the shift include safety and rest    The clinical goals for the shift include Spo2 % above  92    Over the shift, the patient did  make progress toward the following goals. Barriers to progression include shortness of breath. Recommendations to address these barriers include oxygen and respiratory therapies as ordered.     1

## 2025-03-07 NOTE — CARE PLAN
The patient's goals for the shift include safety and rest    The clinical goals for the shift include monitor O2, collect labs    Over the shift, the patient did not make progress toward the following goals. Barriers to progression include none. Recommendations to address these barriers include none.

## 2025-03-07 NOTE — NURSING NOTE
Pt contacted via phone. COPD education/book mailed to patient. Discussed importance of taking medications as prescribed/following up with physician appointments. Outpatient pulmonary rehab education given to patient. Smoking cessation reviewed. Pt currently using nicotine replacement therapy and has cut back on cigarettes over past two weeks.

## 2025-03-07 NOTE — CONSULTS
"Nutrition Assessement Note    Nutrition Assessment    Reason for Assessment: Admission nursing screening    Reason for Hospital Admission:  Jason Martin \"Jose\" is a 69 y.o. male who is admitted for COPD exacerbation, flu A.     Malnutrition Screening Tool (MST)  Have you recently lost weight without trying?: Yes  If yes, how much weight have you lost?: Unsure  Weight Loss Score: 2  Have you been eating poorly because of a decreased appetite?: Yes  Malnutrition Score: 3  Nutrition Screen  Stage 3 or 4 Pressure Injury or Multiple Non-Healing Wounds: No  Home Tube Feeding or Total Parenteral Nutrition (TPN): No  Dietitian Consult Needed: Yes (Comment)  Reason for Consult: unexplained weight loss    Past Medical History:   Diagnosis Date    COPD (chronic obstructive pulmonary disease) (Multi)     Diabetes mellitus (Multi)     Hypertension       Past Surgical History:   Procedure Laterality Date    MR HEAD ANGIO WO IV CONTRAST  2/26/2015    MR HEAD ANGIO WO IV CONTRAST [facility]     Nutrition History:  Energy Intake: Fair 50-75 %  Food and Nutrient History: pt reports having good appetite. states he ate a good breakfast today so only eat ice cream for lunch. typically eats 1-2 meals/day at home and drinks 1-2 boost daily. he is agreeable to ensure at this time.    Anthropometrics:  Ht: 165.1 cm (5' 5\"), Wt: 46.2 kg (101 lb 13.6 oz), BMI: 16.95    Weight Change:  Daily Weight  03/07/25 : 46.2 kg (101 lb 13.6 oz)  11/08/24 : 46.3 kg (102 lb)  09/05/24 : 45.3 kg (99 lb 12.8 oz)  10/27/23 : 53.1 kg (117 lb)  09/12/22 : 51.3 kg (113 lb 1.5 oz)  09/02/22 : 52.6 kg (116 lb)  01/22/22 : 50.8 kg (112 lb)  11/05/21 : 54.2 kg (119 lb 6.4 oz)  02/05/21 : 59.1 kg (130 lb 3.2 oz)    Weight History / % Weight Change: pt appears to be chronically underweight. reports weighing 135-145# years ago but has been maintaining ~100# recently. states he is unsure why he is losing wt.  Significant Weight Loss: No    Nutrition Focused " Physical Exam Findings:   Subcutaneous Fat Loss  Orbital Fat Pads: Mild-Moderate (slight dark circles and slight hollowing)  Buccal Fat Pads: Mild-Moderate (flat cheeks, minimal bounce)  Triceps: Severe (negligible fat tissue)  Ribs: Mild-Moderate (ribs apparent, depressions between them less pronounced, iliac crest somewhat prominent)    Muscle Wasting  Temporalis: Severe (hollowed scooping depression)  Pectoralis (Clavicular Region): Severe (protruding prominent clavicle)  Deltoid/Trapezius: Severe (squared shoulders, acromion process prominent)    Nutrition Significant Labs:  Lab Results   Component Value Date    WBC 14.5 (H) 03/07/2025    HGB 11.8 (L) 03/07/2025    HCT 36.8 (L) 03/07/2025     03/07/2025    CHOL 96 09/05/2024    TRIG 112 09/05/2024    HDL 35.2 09/05/2024    ALT 14 03/07/2025    AST 13 03/07/2025     03/07/2025    K 4.8 03/07/2025     03/07/2025    CREATININE 0.57 03/07/2025    BUN 25 (H) 03/07/2025    CO2 34 (H) 03/07/2025    TSH 2.36 10/27/2023    PSA 2.2 09/05/2024    INR 1.0 03/07/2025    HGBA1C 6.9 (H) 03/07/2025    ALBUR 164 (H) 01/22/2022     Nutrition Specific Medications:  amLODIPine, 5 mg, oral, Daily  aspirin, 81 mg, oral, Daily  atorvastatin, 20 mg, oral, Daily  cefTRIAXone, 2 g, intravenous, q24h  doxycycline, 100 mg, intravenous, q12h  enoxaparin, 40 mg, subcutaneous, Daily  [Held by provider] fluticasone furoate-vilanteroL, 1 puff, inhalation, Daily  insulin lispro, 0-10 Units, subcutaneous, TID AC  ipratropium-albuteroL, 3 mL, nebulization, 4x daily  lisinopril, 10 mg, oral, Daily  methylPREDNISolone sodium succinate (PF), 40 mg, intravenous, q8h CEDRIC  nicotine, 1 patch, transdermal, Daily  oseltamivir, 75 mg, oral, BID  oxygen, , inhalation, Continuous - Inhalation  oxygen, , inhalation, Continuous - Inhalation  pantoprazole, 40 mg, oral, Daily before breakfast   Or  pantoprazole, 40 mg, intravenous, Daily before breakfast  sulfaSALAzine, 500 mg, oral,  Daily  tamsulosin, 0.4 mg, oral, Daily      Dietary Orders (From admission, onward)       Start     Ordered    03/07/25 1242  Oral nutritional supplements  Until discontinued        Comments: Chocolate   Question Answer Comment   Deliver with Breakfast    Deliver with Dinner    Select supplement: Ensure Plus High Protein        03/07/25 1241    03/07/25 0154  May Participate in Room Service  ( ROOM SERVICE MAY PARTICIPATE)  Once        Question:  .  Answer:  Yes    03/07/25 0153    03/06/25 2233  Adult diet Regular, Consistent Carb; CCD 75 gm/meal  Diet effective now        Question Answer Comment   Diet type Regular    Diet type Consistent Carb    Carb diet selection: CCD 75 gm/meal        03/06/25 2234                  Estimated Needs:   Estimated Energy Needs  Total Energy Estimated Needs in 24 hours (kCal): 1607 kCal  Energy Estimated Needs per kg Body Weight in 24 hours (kCal/kg): 35 kCal/kg  Method for Estimating Needs: actual wt    Estimated Protein Needs  Total Protein Estimated Needs in 24 Hours (g): 69 g  Protein Estimated Needs per kg Body Weight in 24 Hours (g/kg): 1.5 g/kg  Method for Estimating 24 Hour Protein Needs: actual wt    Estimated Fluid Needs  Method for Estimating 24 Hour Fluid Needs: 1 ml/kcal or per MD        Nutrition Diagnosis   Nutrition Diagnosis:  Malnutrition Diagnosis  Patient has Malnutrition Diagnosis: Yes  Diagnosis Status: New  Malnutrition Diagnosis: Severe malnutrition related to chronic disease or condition  Related to: decreased ability to consume/tolerate sufficient energy  As Evidenced by: moderate/severe subcutaneous fat loss and muscle wasting       Nutrition Interventions/Recommendations   Nutrition Interventions and Recommendations:  Nutrition Prescription: Nutrition prescription for oral nutrition    Nutrition Recommendations:  Individualized Nutrition Prescription Provided for : 1607 kcals and 69g protein to be provided via diet and supplements    Nutrition  Interventions/Goals:   Food and/or Nutrient Delivery Interventions  Interventions: Meals and snacks, Medical food supplement  Meals and Snacks: Carbohydrate-modified diet  Goal: provide as ordered  Medical Food Supplement: Commercial beverage medical food supplement therapy  Goal: chocolate ensure plus high protein BID to provide 350 kcals and 20g protein each    Education Documentation  No documentation found.           Nutrition Monitoring and Evaluation   Monitoring/Evaluation:   Food/Nutrient Related History Monitoring  Monitoring and Evaluation Plan: Estimated Energy Intake  Estimated Energy Intake: Energy intake greater or equal to 75% of estimated energy needs    Anthropometric Measurements  Monitoring and Evaluation Plan: Body weight  Body Weight: Body weight - Promote weight restoration, Body weight - Maintain stable weight    Goal Status: New goal(s) identified    Follow Up  Time Spent (min): 30 minutes  Last Date of Nutrition Visit: 03/07/25  Nutrition Follow-Up Needed?: 3-5 days  Follow up Comment: 3/11/25

## 2025-03-07 NOTE — H&P
"History Of Present Illness  Jason Martin \"Jasmine" is a 69 y.o. male presenting with shortness of breath.  Patient with known COPD ongoing smoker has been having shortness of breath for a long time worse over the last 2 weeks.  He came to the hospital mostly because he got tired of it there is no chest pain admitted to some chills but no documented fever no specific cough no abdominal pain no chest pain no leg swelling no calf tenderness no sick contacts no nausea vomiting diarrhea or any other symptoms.  Emergency room he was found to be in COPD exacerbation.  He was placed on BiPAP by the ER physician a stable BiPAP for number of hours not really sure what was the indication for the BiPAP as he was never that dramatically hypoxic and he never had symptoms consistent with CO2 retention.  Anyway I took him off BiPAP he seems to be doing fine on nasal cannula oxygen.  Past Medical History  He has a past medical history of COPD (chronic obstructive pulmonary disease) (Multi), Diabetes mellitus (Multi), and Hypertension.  Denies heart problems  Surgical History  He has a past surgical history that includes MR angio head wo IV contrast (2/26/2015).  Reviewed  Social History  He reports that he has been smoking cigarettes. He has a 60 pack-year smoking history. He has never used smokeless tobacco. He reports that he does not currently use alcohol. He reports that he does not currently use drugs after having used the following drugs: Marijuana.  Reviewed  Family History  Family History   Problem Relation Name Age of Onset    Clotting disorder Mother      Other (overdose) Father          Allergies  Patient has no known allergies.    ROS  Review of Systems   Constitutional:  Positive for unexpected weight change.   HENT: Negative.     Eyes: Negative.    Respiratory:  Positive for shortness of breath.    Cardiovascular: Negative.    Gastrointestinal: Negative.    Endocrine: Negative.    Genitourinary: Negative.  "   Musculoskeletal: Negative.    Skin: Negative.    Allergic/Immunologic: Negative.    Neurological: Negative.    Hematological: Negative.    Psychiatric/Behavioral: Negative.     All other systems reviewed and are negative.       Last Recorded Vitals  /82   Pulse (!) 106   Temp 37.2 °C (99 °F) (Oral)   Resp 17   Wt 46.3 kg (102 lb)   SpO2 (!) 90%     Physical Exam  Alert oriented x 3 cooperative  male on BiPAP then off BiPAP saturating 95 6% on 4 L nasal cannula.  Normocephalic/atraumatic EOMI PERRLA  Neck supple  Chest diminished no significant wheezing or crackles  Heart regular S1-S2 distant  Abdomen soft nontender benign exam  Extremities nonperforating good pedal pulses  Neurologic examination gross motor sensor nonfocal  Skin no rash  Psych no psychosis    Relevant Results  EKG of poor quality but I believe sinus rhythm proBNP troponins CT angio not done chest x-ray questioning small bilateral effusions WBC count 12.2; he was influenza A type positive    ASSESSMENT/PLAN  Assessment/Plan   Assessment & Plan  Influenza A  Tamiflu  COPD with acute exacerbation (Multi)  Oxygen steroids aerosols empiric antibiotics check CT angio  Primary hypertension  Continue medications  Diabetes mellitus type 2 in nonobese (Multi)  He is not currently on treatment after he lost weight.  Sliding scale while on steroids  Weight loss  Unclear etiology ; suspect may be related to chronic respiratory failure  Acute on chronic respiratory failure with hypoxia (Multi)  See above  March 6       Tahir Alexandre MD

## 2025-03-07 NOTE — PROGRESS NOTES
03/07/25 1632   Discharge Planning   Type of Residence Private residence   Home or Post Acute Services In home services   Type of Home Care Services Home nursing visits;Home OT;Home PT  (Requested PT/OT orders from attending.  Awaiting updates.  Care Transitions following for updates to d/c needs.  Patient requesting [facility].)   Expected Discharge Disposition Home H   Does the patient need discharge transport arranged? No   Transportation Needs   In the past 12 months, has lack of transportation kept you from medical appointments or from getting medications? no   In the past 12 months, has lack of transportation kept you from meetings, work, or from getting things needed for daily living? No

## 2025-03-07 NOTE — ASSESSMENT & PLAN NOTE
Seen on CT imaging. Will cover with ceftriaxone and doxycycline  Bacterial pneumonia, unspecified organism but likely gram positive

## 2025-03-07 NOTE — PROGRESS NOTES
"Jason Martin \"Jasmine" is a 69 y.o. male on day 1 of admission presenting with Influenza A.      Subjective   Patient reports he's feeling okay. Currently on 6L NC with pulsox 93%, normally wears 2. Has some cough but says he feels better since getting here. No other complaints.       Objective     Last Recorded Vitals  /71 (BP Location: Left arm, Patient Position: Lying)   Pulse 85   Temp 36.5 °C (97.7 °F) (Temporal)   Resp 18   Wt 46.2 kg (101 lb 13.6 oz)   SpO2 94%   Intake/Output last 3 Shifts:    Intake/Output Summary (Last 24 hours) at 3/7/2025 1045  Last data filed at 3/7/2025 0740  Gross per 24 hour   Intake --   Output 100 ml   Net -100 ml       Admission Weight  Weight: 46.3 kg (102 lb) (03/06/25 1627)    Daily Weight  03/07/25 : 46.2 kg (101 lb 13.6 oz)    Image Results  ECG 12 lead   Poor data quality, interpretation may be adversely affected  Undetermined rhythm  Right superior axis deviation  Nonspecific ST and T wave abnormality  Abnormal ECG  No previous ECGs available      Physical Exam  General: alert, no diaphoresis   Lungs: diminished   Heart: RRR, no LE edema BL   GI: abdomen soft, nontender, nondistended, BS present   MSK: no joint effusion or deformity   Skin: no rashes, erythema, or ecchymosis   Neuro: grossly normal cognition, motor strength, sensation    Relevant Results               Assessment/Plan                  Assessment & Plan  Influenza A  Tamiflu  Acute on chronic respiratory failure with hypoxia (Multi)  Normally on 2L, now on 6. If requires HF will consult pulm  Bacterial pneumonia  Seen on CT imaging. Will cover with ceftriaxone and doxycycline  Bacterial pneumonia, unspecified organism but likely gram positive  COPD with acute exacerbation (Multi)  Oxygen steroids aerosols empiric antibiotics c  Primary hypertension  Continue medications  Diabetes mellitus type 2 in nonobese (Multi)  He is not currently on treatment after he lost weight.  Sliding scale while on " steroids  Weight loss  Unclear etiology ; suspect may be related to chronic respiratory failure      DVT ppx              Elke Gillette DO

## 2025-03-08 LAB
BACTERIA SPEC RESP CULT: ABNORMAL
GLUCOSE BLD MANUAL STRIP-MCNC: 153 MG/DL (ref 74–99)
GLUCOSE BLD MANUAL STRIP-MCNC: 253 MG/DL (ref 74–99)
GLUCOSE BLD MANUAL STRIP-MCNC: 288 MG/DL (ref 74–99)
GLUCOSE BLD MANUAL STRIP-MCNC: 348 MG/DL (ref 74–99)
GRAM STN SPEC: ABNORMAL

## 2025-03-08 PROCEDURE — 2500000005 HC RX 250 GENERAL PHARMACY W/O HCPCS: Performed by: STUDENT IN AN ORGANIZED HEALTH CARE EDUCATION/TRAINING PROGRAM

## 2025-03-08 PROCEDURE — 97161 PT EVAL LOW COMPLEX 20 MIN: CPT | Mod: GP

## 2025-03-08 PROCEDURE — 82947 ASSAY GLUCOSE BLOOD QUANT: CPT

## 2025-03-08 PROCEDURE — 94640 AIRWAY INHALATION TREATMENT: CPT

## 2025-03-08 PROCEDURE — 2500000002 HC RX 250 W HCPCS SELF ADMINISTERED DRUGS (ALT 637 FOR MEDICARE OP, ALT 636 FOR OP/ED): Performed by: INTERNAL MEDICINE

## 2025-03-08 PROCEDURE — 99232 SBSQ HOSP IP/OBS MODERATE 35: CPT | Performed by: HOSPITALIST

## 2025-03-08 PROCEDURE — 2500000004 HC RX 250 GENERAL PHARMACY W/ HCPCS (ALT 636 FOR OP/ED): Performed by: HOSPITALIST

## 2025-03-08 PROCEDURE — S4991 NICOTINE PATCH NONLEGEND: HCPCS | Performed by: INTERNAL MEDICINE

## 2025-03-08 PROCEDURE — 2060000001 HC INTERMEDIATE ICU ROOM DAILY

## 2025-03-08 PROCEDURE — 2500000001 HC RX 250 WO HCPCS SELF ADMINISTERED DRUGS (ALT 637 FOR MEDICARE OP): Performed by: HOSPITALIST

## 2025-03-08 PROCEDURE — 2500000001 HC RX 250 WO HCPCS SELF ADMINISTERED DRUGS (ALT 637 FOR MEDICARE OP): Performed by: INTERNAL MEDICINE

## 2025-03-08 PROCEDURE — 2500000004 HC RX 250 GENERAL PHARMACY W/ HCPCS (ALT 636 FOR OP/ED): Performed by: INTERNAL MEDICINE

## 2025-03-08 PROCEDURE — 99291 CRITICAL CARE FIRST HOUR: CPT | Performed by: STUDENT IN AN ORGANIZED HEALTH CARE EDUCATION/TRAINING PROGRAM

## 2025-03-08 PROCEDURE — 97116 GAIT TRAINING THERAPY: CPT | Mod: GP

## 2025-03-08 PROCEDURE — 97165 OT EVAL LOW COMPLEX 30 MIN: CPT | Mod: GO

## 2025-03-08 PROCEDURE — 94760 N-INVAS EAR/PLS OXIMETRY 1: CPT

## 2025-03-08 PROCEDURE — 94664 DEMO&/EVAL PT USE INHALER: CPT

## 2025-03-08 RX ORDER — GUAIFENESIN/DEXTROMETHORPHAN 100-10MG/5
5 SYRUP ORAL EVERY 4 HOURS PRN
Status: DISPENSED | OUTPATIENT
Start: 2025-03-08

## 2025-03-08 RX ADMIN — IPRATROPIUM BROMIDE AND ALBUTEROL SULFATE 3 ML: 2.5; .5 SOLUTION RESPIRATORY (INHALATION) at 14:33

## 2025-03-08 RX ADMIN — IPRATROPIUM BROMIDE AND ALBUTEROL SULFATE 3 ML: 2.5; .5 SOLUTION RESPIRATORY (INHALATION) at 08:14

## 2025-03-08 RX ADMIN — LISINOPRIL 10 MG: 10 TABLET ORAL at 09:03

## 2025-03-08 RX ADMIN — PANTOPRAZOLE SODIUM 40 MG: 40 TABLET, DELAYED RELEASE ORAL at 06:05

## 2025-03-08 RX ADMIN — ASPIRIN 81 MG CHEWABLE TABLET 81 MG: 81 TABLET CHEWABLE at 09:03

## 2025-03-08 RX ADMIN — METHYLPREDNISOLONE SODIUM SUCCINATE 40 MG: 40 INJECTION, POWDER, FOR SOLUTION INTRAMUSCULAR; INTRAVENOUS at 06:05

## 2025-03-08 RX ADMIN — TAMSULOSIN HYDROCHLORIDE 0.4 MG: 0.4 CAPSULE ORAL at 09:03

## 2025-03-08 RX ADMIN — DOXYCYCLINE 100 MG: 100 INJECTION, POWDER, LYOPHILIZED, FOR SOLUTION INTRAVENOUS at 20:24

## 2025-03-08 RX ADMIN — ATORVASTATIN CALCIUM 20 MG: 20 TABLET, FILM COATED ORAL at 09:03

## 2025-03-08 RX ADMIN — GUAIFENESIN AND DEXTROMETHORPHAN 5 ML: 100; 10 SYRUP ORAL at 11:08

## 2025-03-08 RX ADMIN — Medication 36 PERCENT: at 08:14

## 2025-03-08 RX ADMIN — INSULIN LISPRO 8 UNITS: 100 INJECTION, SOLUTION INTRAVENOUS; SUBCUTANEOUS at 12:42

## 2025-03-08 RX ADMIN — OSELTAMIVIR PHOSPHATE 75 MG: 75 CAPSULE ORAL at 09:03

## 2025-03-08 RX ADMIN — INSULIN LISPRO 6 UNITS: 100 INJECTION, SOLUTION INTRAVENOUS; SUBCUTANEOUS at 17:26

## 2025-03-08 RX ADMIN — METHYLPREDNISOLONE SODIUM SUCCINATE 30 MG: 40 INJECTION, POWDER, FOR SOLUTION INTRAMUSCULAR; INTRAVENOUS at 14:25

## 2025-03-08 RX ADMIN — INSULIN LISPRO 2 UNITS: 100 INJECTION, SOLUTION INTRAVENOUS; SUBCUTANEOUS at 09:03

## 2025-03-08 RX ADMIN — OSELTAMIVIR PHOSPHATE 75 MG: 75 CAPSULE ORAL at 20:25

## 2025-03-08 RX ADMIN — NICOTINE 1 PATCH: 14 PATCH, EXTENDED RELEASE TRANSDERMAL at 09:03

## 2025-03-08 RX ADMIN — CEFTRIAXONE SODIUM 2 G: 2 INJECTION, SOLUTION INTRAVENOUS at 10:05

## 2025-03-08 RX ADMIN — METHYLPREDNISOLONE SODIUM SUCCINATE 30 MG: 40 INJECTION, POWDER, FOR SOLUTION INTRAMUSCULAR; INTRAVENOUS at 20:24

## 2025-03-08 RX ADMIN — IPRATROPIUM BROMIDE AND ALBUTEROL SULFATE 3 ML: 2.5; .5 SOLUTION RESPIRATORY (INHALATION) at 19:07

## 2025-03-08 RX ADMIN — Medication 4 L/MIN: at 19:11

## 2025-03-08 RX ADMIN — Medication 5 L/MIN: at 19:38

## 2025-03-08 RX ADMIN — SULFASALAZINE 500 MG: 500 TABLET ORAL at 09:03

## 2025-03-08 RX ADMIN — ENOXAPARIN SODIUM 40 MG: 40 INJECTION SUBCUTANEOUS at 09:24

## 2025-03-08 RX ADMIN — AMLODIPINE BESYLATE 5 MG: 5 TABLET ORAL at 09:03

## 2025-03-08 RX ADMIN — DOXYCYCLINE 100 MG: 100 INJECTION, POWDER, LYOPHILIZED, FOR SOLUTION INTRAVENOUS at 11:08

## 2025-03-08 ASSESSMENT — COGNITIVE AND FUNCTIONAL STATUS - GENERAL
WALKING IN HOSPITAL ROOM: A LITTLE
HELP NEEDED FOR BATHING: A LOT
MOVING TO AND FROM BED TO CHAIR: A LITTLE
HELP NEEDED FOR BATHING: A LITTLE
CLIMB 3 TO 5 STEPS WITH RAILING: A LOT
DRESSING REGULAR LOWER BODY CLOTHING: A LITTLE
DRESSING REGULAR LOWER BODY CLOTHING: A LOT
PERSONAL GROOMING: A LITTLE
CLIMB 3 TO 5 STEPS WITH RAILING: A LOT
DAILY ACTIVITIY SCORE: 21
DAILY ACTIVITIY SCORE: 15
MOVING FROM LYING ON BACK TO SITTING ON SIDE OF FLAT BED WITH BEDRAILS: A LITTLE
STANDING UP FROM CHAIR USING ARMS: A LITTLE
STANDING UP FROM CHAIR USING ARMS: A LITTLE
WALKING IN HOSPITAL ROOM: A LITTLE
MOVING TO AND FROM BED TO CHAIR: A LITTLE
DRESSING REGULAR UPPER BODY CLOTHING: A LITTLE
MOBILITY SCORE: 17
DRESSING REGULAR UPPER BODY CLOTHING: A LITTLE
TOILETING: A LOT
MOBILITY SCORE: 19
TURNING FROM BACK TO SIDE WHILE IN FLAT BAD: A LITTLE
EATING MEALS: A LITTLE

## 2025-03-08 ASSESSMENT — PAIN - FUNCTIONAL ASSESSMENT
PAIN_FUNCTIONAL_ASSESSMENT: 0-10
PAIN_FUNCTIONAL_ASSESSMENT: 0-10

## 2025-03-08 ASSESSMENT — ACTIVITIES OF DAILY LIVING (ADL)
ADL_ASSISTANCE: INDEPENDENT
BATHING_ASSISTANCE: MINIMAL
ADL_ASSISTANCE: INDEPENDENT

## 2025-03-08 ASSESSMENT — PAIN SCALES - GENERAL
PAINLEVEL_OUTOF10: 0 - NO PAIN

## 2025-03-08 NOTE — ED PROCEDURE NOTE
Procedure  Critical Care    Performed by: Kojo Calixto MD  Authorized by: Kojo Calixto MD    Critical care provider statement:     Critical care time (minutes):  31    Critical care time was exclusive of:  Separately billable procedures and treating other patients and teaching time    Critical care was necessary to treat or prevent imminent or life-threatening deterioration of the following conditions:  Cardiac failure, respiratory failure, metabolic crisis and sepsis    Critical care was time spent personally by me on the following activities:  Development of treatment plan with patient or surrogate, evaluation of patient's response to treatment, examination of patient, obtaining history from patient or surrogate, ventilator management, review of old charts, re-evaluation of patient's condition, pulse oximetry, ordering and review of radiographic studies, ordering and review of laboratory studies and ordering and performing treatments and interventions    Care discussed with: admitting provider                 Kojo Calixto MD  03/08/25 2595

## 2025-03-08 NOTE — PROGRESS NOTES
"Occupational Therapy    Evaluation    Patient Name: Jason Martin \"Jose\"  MRN: [id number]  Department: [facility]  Room: 419/[facility]  Today's Date: 3/8/2025  Time Calculation  Start Time: 1029  Stop Time: 1040  Time Calculation (min): 11 min    Assessment  IP OT Assessment  OT Assessment: 70 y/o male presenting with c/c SOB. Admitted for mgmt of COPDE and flu A. On eval, he requires increased assist for xfers, mobility, and self care d/t decreased strength, balance, activity tolerance. Skilled OT services are required to maximize safety/IND prior to DC.  Prognosis: Good  Barriers to Discharge Home: Physical needs  Physical Needs: Intermittent mobility assistance needed, Intermittent ADL assistance needed  Evaluation/Treatment Tolerance: Patient limited by fatigue  Medical Staff Made Aware: Yes  End of Session Communication: Bedside nurse  End of Session Patient Position: Up in bathroom (in bathroom with PT present in room)  Plan:  Treatment Interventions: ADL retraining, Functional transfer training, UE strengthening/ROM, Endurance training, Patient/family training, Equipment evaluation/education, Neuromuscular reeducation, Compensatory technique education  OT Frequency: 3 times per week  OT Discharge Recommendations: Low intensity level of continued care  Equipment Recommended upon Discharge: Wheeled walker  OT Recommended Transfer Status: Assist of 1  OT - OK to Discharge: Yes    Subjective   Current Problem:  1. Influenza A        2. Acute respiratory distress        3. Acute hypoxic respiratory failure (Multi)        4. COPD exacerbation (Multi)        5. Leukocytosis, unspecified type        6. Respiratory acidosis          General:  General  Reason for Referral: Decreased ADLs, SOB, COPDE, flu A  Referred By: Dr. Gillette  Past Medical History Relevant to Rehab: COPD, DM, HTN  Family/Caregiver Present: No  Co-Treatment: PT  Co-Treatment Reason: for safe advancement of functional mobility  Prior to Session " Communication: Bedside nurse  Patient Position Received: Bed, 3 rail up, Alarm on  Preferred Learning Style: verbal, visual  General Comment: Cleared by nsg, pt met in supine, agreeable to OT session  Precautions:  Hearing/Visual Limitations: + glasses  Medical Precautions: Fall precautions, Infection precautions, Oxygen therapy device and L/min  Precautions Comment: droplet precautions, +flu, on 4L O2 via NC     Date/Time Vitals Session Patient Position Pulse Resp SpO2 BP MAP (mmHg)    03/08/25 1029 During OT  --  113  --  94 %  --  --           Pain:  Pain Assessment  Pain Assessment: 0-10  0-10 (Numeric) Pain Score: 0 - No pain    Objective   Cognition:  Overall Cognitive Status: Within Functional Limits  Orientation Level: Oriented X4  Following Commands: Follows multistep commands without difficulty  Cognition Comments: pleasant/cooperative. limited by GIBBS    Home Living:  Type of Home: House  Lives With: Spouse, Adult children, Grandchildren  Home Adaptive Equipment: Walker rolling or standard  Home Layout: Multi-level, Stairs to alternate level with rails  Alternate Level Stairs-Rails: Both  Alternate Level Stairs-Number of Steps: 8 steps to bed/bath and primary living space  Home Access: Stairs to enter without rails  Entrance Stairs-Number of Steps: 1 LORETTA  Bathroom Accessibility: main level  Home Living Comments: lives in a split level, denies going downstairs, has to negotiate 8 steps up to primary living space     Prior Function:  Level of [place]: Independent with ADLs and functional transfers, Needs assistance with homemaking  Receives Help From: Family  ADL Assistance: Independent  Homemaking Assistance:  (family assists)  Ambulatory Assistance:  (with FWW)  Vocational: Retired  Hand Dominance: Right  Prior Function Comments: 1 fall 2 months ago at entry steps, broke his nose    ADL:  Eating Assistance: Stand by  Eating Deficit: Setup  Grooming Assistance: Stand by  Grooming Deficit:  Setup  Bathing Assistance: Minimal  Bathing Deficit:  (anticipated)  UE Dressing Assistance: Minimal  UE Dressing Deficit:  (gown mgmt)  LE Dressing Assistance: Moderate  LE Dressing Deficit: Thread RLE into underwear, Thread LLE into underwear  Toileting Assistance with Device: Moderate  Toileting Deficit:  (for thorough hygiene acts after BM)  ADL Comments: requires increased time and frequent rest breaks d/t GIBBS    Activity Tolerance:  Endurance: Decreased tolerance for upright activites  Activity Tolerance Comments: fair-, on 4L, fatigues quickly    Bed Mobility/Transfers:   Bed Mobility  Bed Mobility: Yes  Bed Mobility 1  Bed Mobility 1: Supine to sitting  Level of Assistance 1: Minimum assistance  Bed Mobility Comments 1: HOB elevated. increased effort    Transfers  Transfer: Yes  Transfer 1  Technique 1: Stand to sit, Sit to stand  Transfer Device 1: Walker  Transfer Level of Assistance 1: Minimum assistance  Trials/Comments 1: to/from EOB and toilet with min A for controlled descent and cues for body positioning, hand placement, walker mgmt    Functional Mobility:  Functional Mobility  Functional Mobility Performed: Yes  Functional Mobility 1  Surface 1: Level tile  Device 1: Rolling walker  Assistance 1: Minimum assistance  Comments 1: short household distance with FWW to restroom, min A for stability    Sitting Balance:  Static Sitting Balance  Static Sitting-Balance Support: Feet supported, Bilateral upper extremity supported  Static Sitting-Level of Assistance: Close supervision  Static Sitting-Comment/Number of Minutes: EOB sitting    Vision: Vision - Basic Assessment  Current Vision: Wears glasses all the time  Sensation:  Light Touch: No apparent deficits (denies numbness/tingling)  Strength:  Strength Comments: BUES at least >/= 3/5, observed functionally  Coordination:  Movements are Fluid and Coordinated: No  Coordination Comment: decreased speed/accuracy   Hand Function:  Hand Function  Gross  Grasp: Functional  Coordination: Functional  Extremities: RUE   RUE : Within Functional Limits and LUE   LUE: Within Functional Limits    Outcome Measures: [facility] Daily Activity  Putting on and taking off regular lower body clothing: A lot  Bathing (including washing, rinsing, drying): A lot  Putting on and taking off regular upper body clothing: A little  Toileting, which includes using toilet, bedpan or urinal: A lot  Taking care of personal grooming such as brushing teeth: A little  Eating Meals: A little  Daily Activity - Total Score: 15      Education Documentation  Body Mechanics, taught by Liborio Calderon OT at 3/8/2025 11:35 AM.  Learner: Patient  Readiness: Acceptance  Method: Explanation  Response: Needs Reinforcement  Comment: Initiated ADL retraining. Educated re: fall precautions, safety techniques, OT POC    Precautions, taught by Liborio Calderon OT at 3/8/2025 11:35 AM.  Learner: Patient  Readiness: Acceptance  Method: Explanation  Response: Needs Reinforcement  Comment: Initiated ADL retraining. Educated re: fall precautions, safety techniques, OT POC    ADL Training, taught by Liborio Calderon OT at 3/8/2025 11:35 AM.  Learner: Patient  Readiness: Acceptance  Method: Explanation  Response: Needs Reinforcement  Comment: Initiated ADL retraining. Educated re: fall precautions, safety techniques, OT POC    Education Comments  No comments found.      Goals:   Encounter Problems       Encounter Problems (Active)       OT Goals       ADLS (Progressing)       Start:  03/08/25    Expected End:  03/22/25       Patient will complete ADL tasks, with modified independence using AE as needed in order to increase patient's safety and independence with self-care tasks.           Functional Transfers (Progressing)       Start:  03/08/25    Expected End:  03/22/25       Patient will complete functional transfers with modified independence using least restrictive device, in order to increase patient's safety and independence  with daily tasks.           Activity Tolerance (Progressing)       Start:  03/08/25    Expected End:  03/22/25       Patient will demonstrate the ability to participate in functional activity at least >/= 20 minutes in order to increase patient's safety and independence with daily tasks.

## 2025-03-08 NOTE — PROGRESS NOTES
"Jason Martin \"Jasmine" is a 69 y.o. male on day 2 of admission presenting with Influenza A.      Subjective   Patient reports he feels okay. Starting breakfast. Coughing some but no other complaints. No SOB. On 4L NC.       Objective     Last Recorded Vitals  /65 (BP Location: Left arm, Patient Position: Lying)   Pulse 86   Temp 36.8 °C (98.2 °F) (Temporal)   Resp 18   Wt 45.2 kg (99 lb 10.4 oz)   SpO2 95%   Intake/Output last 3 Shifts:    Intake/Output Summary (Last 24 hours) at 3/8/2025 0978  Last data filed at 3/7/2025 1942  Gross per 24 hour   Intake 387 ml   Output 250 ml   Net 137 ml       Admission Weight  Weight: 46.3 kg (102 lb) (03/06/25 1627)    Daily Weight  03/08/25 : 45.2 kg (99 lb 10.4 oz)    Image Results  ECG 12 lead   Poor data quality, interpretation may be adversely affected  Undetermined rhythm  Right superior axis deviation  Nonspecific ST and T wave abnormality  Abnormal ECG  No previous ECGs available      Physical Exam  General: alert, no diaphoresis   Lungs: expiratory wheeze throughout   Heart: RRR, no LE edema BL   GI: abdomen soft, nontender, nondistended, BS present   MSK: no joint effusion or deformity   Skin: no rashes, erythema, or ecchymosis   Neuro: grossly normal cognition, motor strength, sensation    Relevant Results               Assessment/Plan                  Assessment & Plan  Influenza A  Tamiflu  Acute on chronic respiratory failure with hypoxia (Multi)  Normally on 2L, now on 6. If requires HF will consult pulm  Bacterial pneumonia  Seen on CT imaging. Will cover with ceftriaxone and doxycycline  Bacterial pneumonia, unspecified organism but likely gram positive  COPD with acute exacerbation (Multi)  Oxygen steroids aerosols empiric antibiotics   Cough medicine  Primary hypertension  Continue medications  Diabetes mellitus type 2 in nonobese (Multi)  He is not currently on treatment after he lost weight.  Sliding scale while on steroids  Weight loss  Unclear " etiology ; suspect may be related to chronic respiratory failure        Malnutrition Diagnosis Status: New  Malnutrition Diagnosis: Severe malnutrition related to chronic disease or condition  Related to: decreased ability to consume/tolerate sufficient energy  As Evidenced by: moderate/severe subcutaneous fat loss and muscle wasting  I agree with the dietitian's malnutrition diagnosis.         Elke Gillette DO

## 2025-03-08 NOTE — CARE PLAN
The patient's goals for the shift include safety and rest    The clinical goals for the shift include Decreased oxygen demand    Over the shift, the patient did not make progress toward the following goals. Barriers to progression include   Problem: Respiratory  Goal: Minimize anxiety/maximize coping throughout shift  Outcome: Progressing     Problem: Respiratory  Goal: Minimal/no exertional discomfort or dyspnea this shift  Outcome: Progressing   . Recommendations to address these barriers include energy conservation and slow movements.

## 2025-03-08 NOTE — PROGRESS NOTES
Physical Therapy    Physical Therapy Evaluation & Treatment    Patient Name: Jose Martin  MRN: [id number]  Department: [address]  Room: [facility]-A  Today's Date: 3/8/2025   Time Calculation  Start Time: 1030  Stop Time: 1053  Time Calculation (min): 23 min    Assessment/Plan   PT Assessment  PT Assessment Results: Decreased strength, Impaired balance, Decreased endurance, Decreased mobility, Decreased safety awareness  Rehab Prognosis: Good  Barriers to Discharge Home: Physical needs  Physical Needs: Ambulating household distances limited by function/safety  Evaluation/Treatment Tolerance: Patient limited by fatigue  Medical Staff Made Aware: Yes  Strengths: Premorbid level of function  Barriers to Participation: Comorbidities  End of Session Communication: Bedside nurse  End of Session Patient Position: Bed, 3 rail up, Alarm on   IP OR SWING BED PT PLAN  Inpatient or Swing Bed: Inpatient  PT Plan  Treatment/Interventions: Bed mobility, Transfer training, Gait training, Balance training, Strengthening, Endurance training, Therapeutic exercise, Therapeutic activity  PT Plan: Ongoing PT  PT Frequency: 4 times per week  PT Discharge Recommendations: Low intensity level of continued care  Equipment Recommended upon Discharge: Wheeled walker  PT Recommended Transfer Status: Assist x1, Assistive device  PT - OK to Discharge: Yes      Subjective     General Visit Information:  General  Reason for Referral: Impaired mobility, Influenza A  Referred By: Dr. Gillette  Past Medical History Relevant to Rehab: COPD, DM, HTN  Family/Caregiver Present: No  Co-Treatment: OT  Co-Treatment Reason: for safe advancement of functional mobility  Prior to Session Communication: Bedside nurse  Patient Position Received: Bed, 3 rail up, Alarm on  Preferred Learning Style: verbal, visual  General Comment: Cleared by nsg, pt met in supine, agreeable to PT session  Home Living:  Home Living  Type of Home: House  Lives With: Spouse, Adult  children, Grandchildren  Home Adaptive Equipment: Walker rolling or standard  Home Layout: Multi-level, Stairs to alternate level with rails  Alternate Level Stairs-Rails: Both  Alternate Level Stairs-Number of Steps: 8 steps to bed/bath and primary living space  Home Access: Stairs to enter without rails  Entrance Stairs-Number of Steps: 1 LORETTA  Bathroom Accessibility: main level  Home Living Comments: lives in a split level, denies going downstairs, has to negotiate 8 steps up to primary living space  Prior Level of Function:  Prior Function Per Pt/Caregiver Report  Level of [place]: Independent with ADLs and functional transfers, Needs assistance with homemaking  Receives Help From: Family  ADL Assistance: Independent  Homemaking Assistance:  (family assists)  Ambulatory Assistance:  (with RW)  Vocational: Retired  Hand Dominance: Right  Prior Function Comments: 1 fall 2 months ago at entry steps, broke his nose  Precautions:  Precautions  Hearing/Visual Limitations: + glasses  Medical Precautions: Fall precautions, Infection precautions, Oxygen therapy device and L/min  Precautions Comment: droplet precautions, +flu, on 4L O2 via NC     Date/Time Vitals Session Patient Position Pulse Resp SpO2 BP MAP (mmHg)    03/08/25 1159 --  --  98  16  93 %  94/46  --     03/08/25 1214 --  --  --  --  --  --  --                Objective   Pain:  Pain Assessment  Pain Assessment: 0-10  0-10 (Numeric) Pain Score: 0 - No pain  Cognition:  Cognition  Overall Cognitive Status: Within Functional Limits  Orientation Level: Oriented X4  Following Commands: Follows multistep commands without difficulty  Cognition Comments: pleasant/cooperative. limited by GIBBS  and cough    General Assessments:  General Observation  General Observation: pleasant and cooperative, +GIBBS with mobility               Activity Tolerance  Endurance: Decreased tolerance for upright activites  Activity Tolerance Comments: fair-, on 4L, fatigues  quickly    Sensation  Light Touch: No apparent deficits  Sensation Comment: denies numbness and tingling all 4 extremities    Strength  Strength Comments: BLEs grossly 3+/5 hip, knee, ankle  Coordination  Movements are Fluid and Coordinated: No  Coordination Comment: decreased speed/accuracy of movement    Postural Control  Postural Control: Within Functional Limits    Static Sitting Balance  Static Sitting-Balance Support: Feet supported  Static Sitting-Level of Assistance: Independent  Dynamic Sitting Balance  Dynamic Sitting-Balance Support: Feet supported  Dynamic Sitting-Level of Assistance: Independent    Static Standing Balance  Static Standing-Balance Support: Bilateral upper extremity supported  Static Standing-Level of Assistance: Contact guard  Static Standing-Comment/Number of Minutes: with RW  Dynamic Standing Balance  Dynamic Standing-Balance Support: Bilateral upper extremity supported  Dynamic Standing-Level of Assistance: Minimum assistance  Dynamic Standing-Balance: Turning  Dynamic Standing-Comments: use of RW  Functional Assessments:  Bed Mobility 1  Bed Mobility 1: Supine to sitting, Sitting to supine  Level of Assistance 1: Minimum assistance  Bed Mobility Comments 1: HOB elevated, increased effort and time    Transfer 1  Transfer From 1: Bed to  Transfer to 1: Stand  Technique 1: Sit to stand, Stand to sit  Transfer Device 1: Walker  Transfer Level of Assistance 1: Minimum assistance  Trials/Comments 1: to/from EOB and toilet with min A for controlled descent and cues for body positioning, hand placement, walker mgmt  Transfers 2  Transfer From 2: Toilet to  Transfer to 2: Stand  Technique 2: Sit to stand, Stand to sit  Transfer Device 2: Walker  Transfer Level of Assistance 2: Moderate assistance  Trials/Comments 2: pulling up from grab bar and bathroom counter    Ambulation/Gait Training  Ambulation/Gait Training Performed: Yes  Ambulation/Gait Training 1  Surface 1: Level tile  Device 1:  Rolling walker  Assistance 1: Minimum assistance  Quality of Gait 1: Narrow base of support, Decreased step length, Soft knee(s), Forward flexed posture  Comments/Distance (ft) 1: 20 ft x 2 with turn,fatigues with distance, +GIBBS, cues for upright posture  Extremity/Trunk Assessments:  RLE and LLE  RLE and L LE:  (grossly 3+/5)  Treatments:  Ambulation/Gait Training  Ambulation/Gait Training Performed: Yes  Ambulation/Gait Training 1  Surface 1: Level tile  Device 1: Rolling walker  Assistance 1: Minimum assistance  Quality of Gait 1: Narrow base of support, Decreased step length, Soft knee(s), Forward flexed posture  Comments/Distance (ft) 1: 20 ft x 2 with turn,fatigues with distance, +GIBBS, cues for upright posture.  Patient also stands at toilet with 1 UE support on rolling walker and manages pericare post using toilet with min A x 1.   Outcome Measures:  [facility] Basic Mobility  Turning from your back to your side while in a flat bed without using bedrails: A little  Moving from lying on your back to sitting on the side of a flat bed without using bedrails: A little  Moving to and from bed to chair (including a wheelchair): A little  Standing up from a chair using your arms (e.g. wheelchair or bedside chair): A little  To walk in hospital room: A little  Climbing 3-5 steps with railing: A lot  Basic Mobility - Total Score: 17    Encounter Problems       Encounter Problems (Active)       PT Goals       Patient will transfer supine to sit and sit to supine with independent assist to facilitate mobility.  (Progressing)       Start:  03/08/25    Expected End:  03/22/25            Patient will transfer sit to stand and stand to sit with  independent assist to facilitate mobility.  (Progressing)       Start:  03/08/25    Expected End:  03/22/25            Patient will amb 150 feet with rolling walker device including two turns on even surface with independent assist to facilitate safe mobility.  (Progressing)       Start:   03/08/25    Expected End:  03/22/25            Patient will improve standing dynamic balance to Good for safety with standing activities.  (Progressing)       Start:  03/08/25    Expected End:  03/22/25               Safety       LTG - Patient will demonstrate safety requirements appropriate to situation/environment       Start:  03/07/25            LTG - Patient will utilize safety techniques       Start:  03/07/25                   Education Documentation  Mobility Training, taught by Jessie Myers, PT at 3/8/2025 12:33 PM.  Learner: Patient  Readiness: Acceptance  Method: Explanation  Response: Verbalizes Understanding, Needs Reinforcement  Comment: Education provided re: safe mobility techniques, PT purpose.

## 2025-03-09 VITALS
WEIGHT: 103 LBS | SYSTOLIC BLOOD PRESSURE: 128 MMHG | DIASTOLIC BLOOD PRESSURE: 69 MMHG | OXYGEN SATURATION: 98 % | HEIGHT: 65 IN | BODY MASS INDEX: 17.16 KG/M2 | HEART RATE: 108 BPM | TEMPERATURE: 98.8 F | RESPIRATION RATE: 20 BRPM

## 2025-03-09 DIAGNOSIS — J44.9 CHRONIC OBSTRUCTIVE PULMONARY DISEASE, UNSPECIFIED COPD TYPE (MULTI): ICD-10-CM

## 2025-03-09 LAB
ATRIAL RATE: 101 BPM
BACTERIA BLD CULT: NORMAL
BACTERIA BLD CULT: NORMAL
GLUCOSE BLD MANUAL STRIP-MCNC: 141 MG/DL (ref 74–99)
GLUCOSE BLD MANUAL STRIP-MCNC: 183 MG/DL (ref 74–99)
GLUCOSE BLD MANUAL STRIP-MCNC: 271 MG/DL (ref 74–99)
GLUCOSE BLD MANUAL STRIP-MCNC: 321 MG/DL (ref 74–99)
M PNEUMO IGM SER IA-ACNC: 0.2 U/L
P AXIS: 85 DEGREES
P OFFSET: 209 MS
P ONSET: 173 MS
PR INTERVAL: 130 MS
Q ONSET: 228 MS
QRS COUNT: 17 BEATS
QRS DURATION: 64 MS
QT INTERVAL: 370 MS
QTC CALCULATION(BAZETT): 486 MS
QTC FREDERICIA: 444 MS
R AXIS: 262 DEGREES
T AXIS: 98 DEGREES
T OFFSET: 413 MS
VENTRICULAR RATE: 104 BPM

## 2025-03-09 PROCEDURE — 2500000002 HC RX 250 W HCPCS SELF ADMINISTERED DRUGS (ALT 637 FOR MEDICARE OP, ALT 636 FOR OP/ED): Performed by: HOSPITALIST

## 2025-03-09 PROCEDURE — 99232 SBSQ HOSP IP/OBS MODERATE 35: CPT | Performed by: HOSPITALIST

## 2025-03-09 PROCEDURE — 2060000001 HC INTERMEDIATE ICU ROOM DAILY

## 2025-03-09 PROCEDURE — S4991 NICOTINE PATCH NONLEGEND: HCPCS | Performed by: INTERNAL MEDICINE

## 2025-03-09 PROCEDURE — 94640 AIRWAY INHALATION TREATMENT: CPT

## 2025-03-09 PROCEDURE — 2500000004 HC RX 250 GENERAL PHARMACY W/ HCPCS (ALT 636 FOR OP/ED): Performed by: HOSPITALIST

## 2025-03-09 PROCEDURE — 94760 N-INVAS EAR/PLS OXIMETRY 1: CPT

## 2025-03-09 PROCEDURE — 82947 ASSAY GLUCOSE BLOOD QUANT: CPT

## 2025-03-09 PROCEDURE — 9420000001 HC RT PATIENT EDUCATION 5 MIN

## 2025-03-09 PROCEDURE — 2500000005 HC RX 250 GENERAL PHARMACY W/O HCPCS: Performed by: STUDENT IN AN ORGANIZED HEALTH CARE EDUCATION/TRAINING PROGRAM

## 2025-03-09 PROCEDURE — 2500000004 HC RX 250 GENERAL PHARMACY W/ HCPCS (ALT 636 FOR OP/ED): Performed by: INTERNAL MEDICINE

## 2025-03-09 PROCEDURE — 2500000001 HC RX 250 WO HCPCS SELF ADMINISTERED DRUGS (ALT 637 FOR MEDICARE OP): Performed by: INTERNAL MEDICINE

## 2025-03-09 PROCEDURE — 2500000002 HC RX 250 W HCPCS SELF ADMINISTERED DRUGS (ALT 637 FOR MEDICARE OP, ALT 636 FOR OP/ED): Performed by: INTERNAL MEDICINE

## 2025-03-09 RX ORDER — IPRATROPIUM BROMIDE AND ALBUTEROL SULFATE 2.5; .5 MG/3ML; MG/3ML
3 SOLUTION RESPIRATORY (INHALATION)
Status: DISPENSED | OUTPATIENT
Start: 2025-03-09

## 2025-03-09 RX ADMIN — Medication 5 L/MIN: at 20:09

## 2025-03-09 RX ADMIN — ATORVASTATIN CALCIUM 20 MG: 20 TABLET, FILM COATED ORAL at 08:57

## 2025-03-09 RX ADMIN — INSULIN LISPRO 2 UNITS: 100 INJECTION, SOLUTION INTRAVENOUS; SUBCUTANEOUS at 08:02

## 2025-03-09 RX ADMIN — ASPIRIN 81 MG CHEWABLE TABLET 81 MG: 81 TABLET CHEWABLE at 08:58

## 2025-03-09 RX ADMIN — OSELTAMIVIR PHOSPHATE 75 MG: 75 CAPSULE ORAL at 09:00

## 2025-03-09 RX ADMIN — Medication 4 L/MIN: at 08:30

## 2025-03-09 RX ADMIN — IPRATROPIUM BROMIDE AND ALBUTEROL SULFATE 3 ML: 2.5; .5 SOLUTION RESPIRATORY (INHALATION) at 08:28

## 2025-03-09 RX ADMIN — ENOXAPARIN SODIUM 40 MG: 40 INJECTION SUBCUTANEOUS at 08:57

## 2025-03-09 RX ADMIN — CEFTRIAXONE SODIUM 2 G: 2 INJECTION, SOLUTION INTRAVENOUS at 09:08

## 2025-03-09 RX ADMIN — SULFASALAZINE 500 MG: 500 TABLET ORAL at 09:01

## 2025-03-09 RX ADMIN — LISINOPRIL 10 MG: 10 TABLET ORAL at 08:57

## 2025-03-09 RX ADMIN — TAMSULOSIN HYDROCHLORIDE 0.4 MG: 0.4 CAPSULE ORAL at 08:58

## 2025-03-09 RX ADMIN — IPRATROPIUM BROMIDE AND ALBUTEROL SULFATE 3 ML: 2.5; .5 SOLUTION RESPIRATORY (INHALATION) at 20:01

## 2025-03-09 RX ADMIN — INSULIN LISPRO 6 UNITS: 100 INJECTION, SOLUTION INTRAVENOUS; SUBCUTANEOUS at 12:01

## 2025-03-09 RX ADMIN — METHYLPREDNISOLONE SODIUM SUCCINATE 30 MG: 40 INJECTION, POWDER, FOR SOLUTION INTRAMUSCULAR; INTRAVENOUS at 06:06

## 2025-03-09 RX ADMIN — OSELTAMIVIR PHOSPHATE 75 MG: 75 CAPSULE ORAL at 21:11

## 2025-03-09 RX ADMIN — DOXYCYCLINE 100 MG: 100 INJECTION, POWDER, LYOPHILIZED, FOR SOLUTION INTRAVENOUS at 09:42

## 2025-03-09 RX ADMIN — AMLODIPINE BESYLATE 5 MG: 5 TABLET ORAL at 08:58

## 2025-03-09 RX ADMIN — PANTOPRAZOLE SODIUM 40 MG: 40 TABLET, DELAYED RELEASE ORAL at 06:06

## 2025-03-09 RX ADMIN — DOXYCYCLINE 100 MG: 100 INJECTION, POWDER, LYOPHILIZED, FOR SOLUTION INTRAVENOUS at 21:11

## 2025-03-09 RX ADMIN — Medication 4 L/MIN: at 09:00

## 2025-03-09 RX ADMIN — NICOTINE 1 PATCH: 14 PATCH, EXTENDED RELEASE TRANSDERMAL at 09:00

## 2025-03-09 RX ADMIN — METHYLPREDNISOLONE SODIUM SUCCINATE 30 MG: 40 INJECTION, POWDER, FOR SOLUTION INTRAMUSCULAR; INTRAVENOUS at 17:06

## 2025-03-09 RX ADMIN — Medication 3 L/MIN: at 20:03

## 2025-03-09 ASSESSMENT — PAIN SCALES - GENERAL
PAINLEVEL_OUTOF10: 0 - NO PAIN

## 2025-03-09 ASSESSMENT — COGNITIVE AND FUNCTIONAL STATUS - GENERAL
MOVING TO AND FROM BED TO CHAIR: A LITTLE
STANDING UP FROM CHAIR USING ARMS: A LITTLE
MOBILITY SCORE: 19
CLIMB 3 TO 5 STEPS WITH RAILING: A LOT
WALKING IN HOSPITAL ROOM: A LITTLE

## 2025-03-09 ASSESSMENT — PAIN - FUNCTIONAL ASSESSMENT
PAIN_FUNCTIONAL_ASSESSMENT: 0-10
PAIN_FUNCTIONAL_ASSESSMENT: 0-10

## 2025-03-09 NOTE — PROGRESS NOTES
"Jason Martin \"Jasmine" is a 69 y.o. male on day 3 of admission presenting with Influenza A.      Subjective   Patient reports he feels okay. Still coughing a lot. No pain, no SOB. Feels he's overall improving.    Remains on 4L NC.       Objective     Last Recorded Vitals  /73 (BP Location: Right arm, Patient Position: Lying)   Pulse 98   Temp 36.9 °C (98.4 °F) (Temporal)   Resp 18   Wt 46.7 kg (103 lb)   SpO2 94%   Intake/Output last 3 Shifts:    Intake/Output Summary (Last 24 hours) at 3/9/2025 0845  Last data filed at 3/9/2025 0700  Gross per 24 hour   Intake --   Output 250 ml   Net -250 ml       Admission Weight  Weight: 46.3 kg (102 lb) (03/06/25 1627)    Daily Weight  03/09/25 : 46.7 kg (103 lb)    Image Results  ECG 12 lead   Poor data quality, interpretation may be adversely affected  Undetermined rhythm  Right superior axis deviation  Nonspecific ST and T wave abnormality  Abnormal ECG  No previous ECGs available      Physical Exam  General: alert, no diaphoresis   Lungs: diminished no wheeze or rhonchi   Heart: RRR, no LE edema BL   GI: abdomen soft, nontender, nondistended, BS present   MSK: no joint effusion or deformity   Skin: no rashes, erythema, or ecchymosis   Neuro: grossly normal cognition, motor strength, sensation    Relevant Results               Assessment/Plan                  Assessment & Plan  Influenza A  Tamiflu  Acute on chronic respiratory failure with hypoxia (Multi)  Normally on 2L, now on 4. RT and RN to wean. Patient actually says today that he doesn't wear O2 at home- regardless expect he will need home O2 cert at time of discharge.  Bacterial pneumonia  Seen on CT imaging. Will cover with ceftriaxone and doxycycline  Bacterial pneumonia, unspecified organism but likely gram positive  COPD with acute exacerbation (Multi)  Oxygen steroids aerosols empiric antibiotics   Cough medicine  Decrease steroids  Primary hypertension  Continue medications  Diabetes mellitus type 2 " in nonobese (Multi)  He is not currently on treatment after he lost weight.  Sliding scale while on steroids  Weight loss  Unclear etiology ; suspect may be related to chronic respiratory failure         Malnutrition Diagnosis Status: New  Malnutrition Diagnosis: Severe malnutrition related to chronic disease or condition  Related to: decreased ability to consume/tolerate sufficient energy  As Evidenced by: moderate/severe subcutaneous fat loss and muscle wasting  I agree with the dietitian's malnutrition diagnosis.       Improving. Need to wean O2. Likely here another 1-2 days.    Elke Gillette DO

## 2025-03-09 NOTE — CARE PLAN
Problem: Respiratory  Goal: Clear secretions with interventions this shift  Outcome: Progressing  Goal: Minimize anxiety/maximize coping throughout shift  Outcome: Progressing  Goal: Minimal/no exertional discomfort or dyspnea this shift  Outcome: Progressing  Goal: No signs of respiratory distress (eg. Use of accessory muscles. Peds grunting)  Outcome: Progressing  Goal: Verbalize decreased shortness of breath this shift  Outcome: Progressing

## 2025-03-09 NOTE — CARE PLAN
The patient's goals for the shift include rest, have less shortness of breath with exertion  The clinical goals for the shift include Maintain hemodynamic stability    Over the shift, the patient did not make progress toward the following goals. Barriers to progression include co-morbities and disease process. Recommendations to address these barriers include continue with plan of care.

## 2025-03-09 NOTE — ASSESSMENT & PLAN NOTE
Normally on 2L, now on 4. RT and RN to wean. Patient actually says today that he doesn't wear O2 at home- regardless expect he will need home O2 cert at time of discharge.

## 2025-03-10 LAB
GLUCOSE BLD MANUAL STRIP-MCNC: 140 MG/DL (ref 74–99)
GLUCOSE BLD MANUAL STRIP-MCNC: 147 MG/DL (ref 74–99)
GLUCOSE BLD MANUAL STRIP-MCNC: 329 MG/DL (ref 74–99)
GLUCOSE BLD MANUAL STRIP-MCNC: 365 MG/DL (ref 74–99)

## 2025-03-10 PROCEDURE — 82947 ASSAY GLUCOSE BLOOD QUANT: CPT

## 2025-03-10 PROCEDURE — 2060000001 HC INTERMEDIATE ICU ROOM DAILY

## 2025-03-10 PROCEDURE — S4991 NICOTINE PATCH NONLEGEND: HCPCS | Performed by: INTERNAL MEDICINE

## 2025-03-10 PROCEDURE — 2500000002 HC RX 250 W HCPCS SELF ADMINISTERED DRUGS (ALT 637 FOR MEDICARE OP, ALT 636 FOR OP/ED): Performed by: INTERNAL MEDICINE

## 2025-03-10 PROCEDURE — 2500000005 HC RX 250 GENERAL PHARMACY W/O HCPCS: Performed by: INTERNAL MEDICINE

## 2025-03-10 PROCEDURE — 94640 AIRWAY INHALATION TREATMENT: CPT

## 2025-03-10 PROCEDURE — 2500000004 HC RX 250 GENERAL PHARMACY W/ HCPCS (ALT 636 FOR OP/ED): Performed by: INTERNAL MEDICINE

## 2025-03-10 PROCEDURE — 97530 THERAPEUTIC ACTIVITIES: CPT | Mod: GO,CO

## 2025-03-10 PROCEDURE — 2500000005 HC RX 250 GENERAL PHARMACY W/O HCPCS: Performed by: STUDENT IN AN ORGANIZED HEALTH CARE EDUCATION/TRAINING PROGRAM

## 2025-03-10 PROCEDURE — 97535 SELF CARE MNGMENT TRAINING: CPT | Mod: GO,CO

## 2025-03-10 PROCEDURE — 99232 SBSQ HOSP IP/OBS MODERATE 35: CPT | Performed by: INTERNAL MEDICINE

## 2025-03-10 PROCEDURE — 2500000002 HC RX 250 W HCPCS SELF ADMINISTERED DRUGS (ALT 637 FOR MEDICARE OP, ALT 636 FOR OP/ED): Performed by: HOSPITALIST

## 2025-03-10 PROCEDURE — 2500000001 HC RX 250 WO HCPCS SELF ADMINISTERED DRUGS (ALT 637 FOR MEDICARE OP): Performed by: INTERNAL MEDICINE

## 2025-03-10 PROCEDURE — 2500000004 HC RX 250 GENERAL PHARMACY W/ HCPCS (ALT 636 FOR OP/ED): Performed by: HOSPITALIST

## 2025-03-10 RX ORDER — AMOXICILLIN 500 MG/1
500 CAPSULE ORAL EVERY 6 HOURS
Status: DISCONTINUED | OUTPATIENT
Start: 2025-03-10 | End: 2025-03-11 | Stop reason: HOSPADM

## 2025-03-10 RX ADMIN — INSULIN LISPRO 10 UNITS: 100 INJECTION, SOLUTION INTRAVENOUS; SUBCUTANEOUS at 11:37

## 2025-03-10 RX ADMIN — SULFASALAZINE 500 MG: 500 TABLET ORAL at 08:23

## 2025-03-10 RX ADMIN — OSELTAMIVIR PHOSPHATE 75 MG: 75 CAPSULE ORAL at 20:30

## 2025-03-10 RX ADMIN — ASPIRIN 81 MG CHEWABLE TABLET 81 MG: 81 TABLET CHEWABLE at 08:22

## 2025-03-10 RX ADMIN — AMOXICILLIN 500 MG: 500 CAPSULE ORAL at 20:30

## 2025-03-10 RX ADMIN — PANTOPRAZOLE SODIUM 40 MG: 40 TABLET, DELAYED RELEASE ORAL at 06:03

## 2025-03-10 RX ADMIN — METHYLPREDNISOLONE SODIUM SUCCINATE 30 MG: 40 INJECTION, POWDER, FOR SOLUTION INTRAMUSCULAR; INTRAVENOUS at 06:03

## 2025-03-10 RX ADMIN — TAMSULOSIN HYDROCHLORIDE 0.4 MG: 0.4 CAPSULE ORAL at 08:22

## 2025-03-10 RX ADMIN — Medication 3 L/MIN: at 15:57

## 2025-03-10 RX ADMIN — AMOXICILLIN 500 MG: 500 CAPSULE ORAL at 15:29

## 2025-03-10 RX ADMIN — Medication 3 L/MIN: at 22:48

## 2025-03-10 RX ADMIN — IPRATROPIUM BROMIDE AND ALBUTEROL SULFATE 3 ML: 2.5; .5 SOLUTION RESPIRATORY (INHALATION) at 12:54

## 2025-03-10 RX ADMIN — NICOTINE 1 PATCH: 14 PATCH, EXTENDED RELEASE TRANSDERMAL at 08:22

## 2025-03-10 RX ADMIN — ATORVASTATIN CALCIUM 20 MG: 20 TABLET, FILM COATED ORAL at 08:22

## 2025-03-10 RX ADMIN — ENOXAPARIN SODIUM 40 MG: 40 INJECTION SUBCUTANEOUS at 08:22

## 2025-03-10 RX ADMIN — LISINOPRIL 10 MG: 10 TABLET ORAL at 08:22

## 2025-03-10 RX ADMIN — IPRATROPIUM BROMIDE AND ALBUTEROL SULFATE 3 ML: 2.5; .5 SOLUTION RESPIRATORY (INHALATION) at 08:28

## 2025-03-10 RX ADMIN — Medication 3 L/MIN: at 08:32

## 2025-03-10 RX ADMIN — Medication 32 PERCENT: at 08:32

## 2025-03-10 RX ADMIN — IPRATROPIUM BROMIDE AND ALBUTEROL SULFATE 3 ML: 2.5; .5 SOLUTION RESPIRATORY (INHALATION) at 20:27

## 2025-03-10 RX ADMIN — AMOXICILLIN 500 MG: 500 CAPSULE ORAL at 09:41

## 2025-03-10 RX ADMIN — OSELTAMIVIR PHOSPHATE 75 MG: 75 CAPSULE ORAL at 08:23

## 2025-03-10 RX ADMIN — AMLODIPINE BESYLATE 5 MG: 5 TABLET ORAL at 08:22

## 2025-03-10 RX ADMIN — METHYLPREDNISOLONE SODIUM SUCCINATE 30 MG: 40 INJECTION, POWDER, FOR SOLUTION INTRAMUSCULAR; INTRAVENOUS at 17:02

## 2025-03-10 ASSESSMENT — COGNITIVE AND FUNCTIONAL STATUS - GENERAL
TOILETING: A LOT
DAILY ACTIVITIY SCORE: 21
DRESSING REGULAR UPPER BODY CLOTHING: A LITTLE
DRESSING REGULAR LOWER BODY CLOTHING: A LITTLE
DRESSING REGULAR UPPER BODY CLOTHING: A LITTLE
DAILY ACTIVITIY SCORE: 21
STANDING UP FROM CHAIR USING ARMS: A LITTLE
CLIMB 3 TO 5 STEPS WITH RAILING: A LOT
HELP NEEDED FOR BATHING: A LITTLE
DAILY ACTIVITIY SCORE: 15
CLIMB 3 TO 5 STEPS WITH RAILING: A LOT
DRESSING REGULAR LOWER BODY CLOTHING: A LITTLE
WALKING IN HOSPITAL ROOM: A LITTLE
DRESSING REGULAR LOWER BODY CLOTHING: A LOT
HELP NEEDED FOR BATHING: A LOT
DRESSING REGULAR UPPER BODY CLOTHING: A LITTLE
MOBILITY SCORE: 19
MOVING TO AND FROM BED TO CHAIR: A LITTLE
MOVING TO AND FROM BED TO CHAIR: A LITTLE
HELP NEEDED FOR BATHING: A LITTLE
WALKING IN HOSPITAL ROOM: A LITTLE
MOBILITY SCORE: 19
EATING MEALS: A LITTLE
PERSONAL GROOMING: A LITTLE
STANDING UP FROM CHAIR USING ARMS: A LITTLE

## 2025-03-10 ASSESSMENT — PAIN SCALES - WONG BAKER: WONGBAKER_NUMERICALRESPONSE: NO HURT

## 2025-03-10 ASSESSMENT — PAIN - FUNCTIONAL ASSESSMENT
PAIN_FUNCTIONAL_ASSESSMENT: 0-10

## 2025-03-10 ASSESSMENT — PAIN SCALES - GENERAL
PAINLEVEL_OUTOF10: 0 - NO PAIN

## 2025-03-10 ASSESSMENT — ACTIVITIES OF DAILY LIVING (ADL): HOME_MANAGEMENT_TIME_ENTRY: 14

## 2025-03-10 NOTE — PROGRESS NOTES
"Occupational Therapy    OT Treatment    Patient Name: Jason Martin \"Jose\"  MRN: [id number]  Department: [address]  Room: 419/419-A  Today's Date: 3/10/2025  Time Calculation  Start Time: 1509  Stop Time: 1533  Time Calculation (min): 24 min        Assessment:  OT Assessment: Pt is motivated to participate in therapy. Pt remain below baseline in strength and overall function. Pt requires frequent restbreaks with simple tasks D/T SOB.  Prognosis: Good  Barriers to Discharge Home: Physical needs  Physical Needs: Intermittent mobility assistance needed, Intermittent ADL assistance needed  Evaluation/Treatment Tolerance: Patient limited by fatigue  Medical Staff Made Aware: Yes  End of Session Communication: Bedside nurse  End of Session Patient Position: Bed, 3 rail up, Alarm on  OT Assessment Results: Decreased ADL status, Decreased upper extremity strength, Decreased endurance, Decreased functional mobility  Prognosis: Good  Evaluation/Treatment Tolerance: Patient limited by fatigue  Medical Staff Made Aware: Yes  Strengths: Premorbid level of function  Barriers to Participation: Comorbidities  Plan:  Treatment Interventions: ADL retraining, Functional transfer training  OT Frequency: 3 times per week  OT Discharge Recommendations: Low intensity level of continued care  Equipment Recommended upon Discharge: Wheeled walker  OT Recommended Transfer Status: Assist of 1  OT - OK to Discharge: Yes  Treatment Interventions: ADL retraining, Functional transfer training    Subjective   Previous Visit Info:  OT Last Visit  OT Received On: 03/10/25  General:  General  Reason for Referral: Impaired mobility, Influenza A  Referred By: Dr. Gillette  Past Medical History Relevant to Rehab: COPD, DM, HTN  Prior to Session Communication: Bedside nurse  Patient Position Received: Bed, 3 rail up, Alarm on  Preferred Learning Style: verbal, visual  General Comment: Cleared by nsg, pt met in supine, agreeable to OT " session  Precautions:  Hearing/Visual Limitations: + glasses  Medical Precautions: Fall precautions, Infection precautions, Oxygen therapy device and L/min  Precautions Comment: droplet precautions, +flu, on 4L O2 via NC            Pain:  Pain Assessment  Pain Assessment: 0-10  0-10 (Numeric) Pain Score: 0 - No pain    Objective    Cognition:  Cognition  Overall Cognitive Status: Within Functional Limits  Orientation Level: Oriented X4  Following Commands: Follows multistep commands without difficulty  Cognition Comments: pleasant/cooperative. limited by GIBBS  Coordination:     Activities of Daily Living:      UE Bathing  UE Bathing Level of Assistance: Setup  UE Bathing Where Assessed: Other (Comment) (Chair)  UE Bathing Comments: Pt was able to perform sponge bathing while seated in chair with wash tub.    UE Dressing  UE Dressing Level of Assistance: Setup  UE Dressing Where Assessed: Chair  UE Dressing Comments: Able to don/doff gown while seated wit set-up. SOB. VC's for restbreaks.    LE Dressing  Sock Level of Assistance: Moderate assistance  LE Dressing Where Assessed: Bed level  LE Dressing Comments: Pt able to don socks while supine and with figure 4 techniuqe. Pt with increasedfatigue/ SOB. Required assist to thread over feet. Pt able to adjust socks past ankles.  Functional Standing Tolerance:     Bed Mobility/Transfers: Bed Mobility 1  Bed Mobility 1: Supine to sitting  Level of Assistance 1: Contact guard  Bed Mobility Comments 1: HOB elevated with use of bed rails. Fatigued after transfer. Required restbreak.    Transfer 1  Transfer From 1: Bed to  Transfer to 1: Chair with arms  Technique 1: Sit to stand, Stand to sit  Transfer Device 1: Walker  Transfer Level of Assistance 1: Minimum assistance  Trials/Comments 1: Assist for stability. VC's for technique/ safety.    Outcome Measures:[facility] Daily Activity  Putting on and taking off regular lower body clothing: A lot  Bathing (including washing,  rinsing, drying): A lot  Putting on and taking off regular upper body clothing: A little  Toileting, which includes using toilet, bedpan or urinal: A lot  Taking care of personal grooming such as brushing teeth: A little  Eating Meals: A little  Daily Activity - Total Score: 15    Education Documentation  Handouts, taught by ABBY Booker at 3/10/2025  3:40 PM.  Learner: Patient  Readiness: Acceptance  Method: Explanation  Response: Verbalizes Understanding, Needs Reinforcement    Body Mechanics, taught by ABBY Booker at 3/10/2025  3:40 PM.  Learner: Patient  Readiness: Acceptance  Method: Explanation  Response: Verbalizes Understanding, Needs Reinforcement    Precautions, taught by ABBY Booker at 3/10/2025  3:40 PM.  Learner: Patient  Readiness: Acceptance  Method: Explanation  Response: Verbalizes Understanding, Needs Reinforcement    Home Exercise Program, taught by ABBY Booker at 3/10/2025  3:40 PM.  Learner: Patient  Readiness: Acceptance  Method: Explanation  Response: Verbalizes Understanding, Needs Reinforcement    ADL Training, taught by ABBY Booker at 3/10/2025  3:40 PM.  Learner: Patient  Readiness: Acceptance  Method: Explanation  Response: Verbalizes Understanding, Needs Reinforcement    Education Comments  No comments found.        OP EDUCATION:  Education  Individual(s) Educated: Patient  Education Provided: Fall precautons, Risk and benefits of OT discussed with patient or other, POC discussed and agreed upon  Risk and Benefits Discussed with Patient/Caregiver/Other: yes  Patient/Caregiver Demonstrated Understanding: yes  Plan of Care Discussed and Agreed Upon: yes  Patient Response to Education: Patient/Caregiver Verbalized Understanding of Information    Goals:  Encounter Problems       Encounter Problems (Active)       OT Goals       ADLS (Progressing)       Start:  03/08/25    Expected End:  03/22/25       Patient will complete ADL tasks, with  modified independence using AE as needed in order to increase patient's safety and independence with self-care tasks.           Functional Transfers (Progressing)       Start:  03/08/25    Expected End:  03/22/25       Patient will complete functional transfers with modified independence using least restrictive device, in order to increase patient's safety and independence with daily tasks.           Activity Tolerance (Progressing)       Start:  03/08/25    Expected End:  03/22/25       Patient will demonstrate the ability to participate in functional activity at least >/= 20 minutes in order to increase patient's safety and independence with daily tasks.

## 2025-03-10 NOTE — CARE PLAN
The patient's goals for the shift include move around     The clinical goals for the shift include maintain hemodynamic stability    Over the shift, the patient did not make progress toward the following goals. Barriers to progression include patient and disease process. Recommendations to address these barriers include education.

## 2025-03-10 NOTE — PROGRESS NOTES
"Jason Martin \"Jasmine" is a 69 y.o. male on day 4 of admission presenting with Influenza A.      Subjective   Doing okay today.  Better than yesterday.       Objective     Last Recorded Vitals  /65 (BP Location: Left arm, Patient Position: Lying)   Pulse 92   Temp 37.3 °C (99.1 °F) (Temporal)   Resp 18   Wt 46.7 kg (102 lb 15.3 oz)   SpO2 93%   Intake/Output last 3 Shifts:    Intake/Output Summary (Last 24 hours) at 3/10/2025 1346  Last data filed at 3/10/2025 0428  Gross per 24 hour   Intake --   Output 650 ml   Net -650 ml       Admission Weight  Weight: 46.3 kg (102 lb) (03/06/25 1627)    Daily Weight  03/10/25 : 46.7 kg (102 lb 15.3 oz)    Image Results  ECG 12 lead  ** Poor data quality, interpretation may be adversely affected  Undetermined rhythm  Right superior axis deviation  Nonspecific ST and T wave abnormality  Abnormal ECG  No previous ECGs available  Confirmed by Bob Arroyo ([id number]) on 3/9/2025 7:27:08 PM      Physical Exam  Generally no acute distress  HEENT PERRL EOMI  Cardiovascular S1-S2 regular rate rhythm  Lungs very distant bilaterally mild expiratory wheeze  Abdomen nontender bowel sounds present  Extremities no clubbing cyanosis edema  Relevant Results  Scheduled medications  amLODIPine, 5 mg, oral, Daily  amoxicillin, 500 mg, oral, q6h  aspirin, 81 mg, oral, Daily  atorvastatin, 20 mg, oral, Daily  enoxaparin, 40 mg, subcutaneous, Daily  [Held by provider] fluticasone furoate-vilanteroL, 1 puff, inhalation, Daily  insulin lispro, 0-10 Units, subcutaneous, TID AC  ipratropium-albuteroL, 3 mL, nebulization, TID  lisinopril, 10 mg, oral, Daily  methylPREDNISolone sodium succinate (PF), 30 mg, intravenous, q12h  nicotine, 1 patch, transdermal, Daily  oseltamivir, 75 mg, oral, BID  oxygen, , inhalation, q8h  pantoprazole, 40 mg, oral, Daily before breakfast   Or  pantoprazole, 40 mg, intravenous, Daily before breakfast  sulfaSALAzine, 500 mg, oral, Daily  tamsulosin, 0.4 mg, oral, " Daily      Continuous medications     PRN medications  PRN medications: acetaminophen **OR** acetaminophen **OR** acetaminophen, dextromethorphan-guaifenesin, dextrose, dextrose, glucagon, glucagon, ipratropium-albuteroL  Results for orders placed or performed during the hospital encounter of 03/06/25 (from the past 24 hours)   POCT GLUCOSE   Result Value Ref Range    POCT Glucose 141 (H) 74 - 99 mg/dL   POCT GLUCOSE   Result Value Ref Range    POCT Glucose 321 (H) 74 - 99 mg/dL   POCT GLUCOSE   Result Value Ref Range    POCT Glucose 140 (H) 74 - 99 mg/dL   POCT GLUCOSE   Result Value Ref Range    POCT Glucose 365 (H) 74 - 99 mg/dL     Impression: 69-year-old gentleman admitted with influenza A, strep pneumonia, and exacerbation of COPD.    Plan:    1.  Acute on chronic respiratory failure  -Continue with O2, wean as tolerated to baseline.  Patient does not wear his home oxygen.  Will likely need on discharge.    2.  Influenza A  -Continue Tamiflu    3.  Strep pneumonia  -Stopped ceftriaxone and doxycycline, transition to p.o. amoxicillin 500 4 times daily.    4.  COPD exacerbation  -Continue steroids, oxygen and inhalers/nebulizers.           Assessment/Plan                                Leo Carranza MD

## 2025-03-10 NOTE — CARE PLAN
The patient's goals for the shift include safety and rest    The clinical goals for the shift include Maintain hemodynamic stability    Over the shift, the patient did not make progress toward the following goals. Barriers to progression include none. Recommendations to address these barriers include n/a.

## 2025-03-10 NOTE — PROGRESS NOTES
03/10/25 1051   Discharge Planning   Home or Post Acute Services In home services  (Patient requesting [facility].  LOW 17/24.  INTERNAL referral for home care needed at d/c for [facility].)   Type of Home Care Services Home nursing visits;Home OT;Home PT   Expected Discharge Disposition Home H   Does the patient need discharge transport arranged? No     1600  [facility] referral in process for PT and RN.  Awaiting updates/acceptance.

## 2025-03-11 ENCOUNTER — PHARMACY VISIT (OUTPATIENT)
Dept: PHARMACY | Facility: CLINIC | Age: 70
End: 2025-03-11
Payer: COMMERCIAL

## 2025-03-11 ENCOUNTER — DOCUMENTATION (OUTPATIENT)
Dept: HOME HEALTH SERVICES | Facility: HOME HEALTH | Age: 70
End: 2025-03-11
Payer: MEDICARE

## 2025-03-11 VITALS
WEIGHT: 107.5 LBS | TEMPERATURE: 98.6 F | HEART RATE: 100 BPM | OXYGEN SATURATION: 92 % | SYSTOLIC BLOOD PRESSURE: 105 MMHG | HEIGHT: 65 IN | DIASTOLIC BLOOD PRESSURE: 63 MMHG | RESPIRATION RATE: 17 BRPM | BODY MASS INDEX: 17.91 KG/M2

## 2025-03-11 PROBLEM — J44.1 COPD WITH ACUTE EXACERBATION (MULTI): Status: RESOLVED | Noted: 2023-10-25 | Resolved: 2025-03-11

## 2025-03-11 PROBLEM — J10.1 INFLUENZA A: Status: RESOLVED | Noted: 2025-03-06 | Resolved: 2025-03-11

## 2025-03-11 PROBLEM — J15.9 BACTERIAL PNEUMONIA: Status: RESOLVED | Noted: 2025-03-07 | Resolved: 2025-03-11

## 2025-03-11 PROBLEM — J96.21 ACUTE ON CHRONIC RESPIRATORY FAILURE WITH HYPOXIA: Status: RESOLVED | Noted: 2025-03-06 | Resolved: 2025-03-11

## 2025-03-11 LAB
BACTERIA BLD CULT: NORMAL
BACTERIA BLD CULT: NORMAL
GLUCOSE BLD MANUAL STRIP-MCNC: 144 MG/DL (ref 74–99)
GLUCOSE BLD MANUAL STRIP-MCNC: 151 MG/DL (ref 74–99)
GLUCOSE BLD MANUAL STRIP-MCNC: 285 MG/DL (ref 74–99)

## 2025-03-11 PROCEDURE — 2500000004 HC RX 250 GENERAL PHARMACY W/ HCPCS (ALT 636 FOR OP/ED): Performed by: INTERNAL MEDICINE

## 2025-03-11 PROCEDURE — 2500000001 HC RX 250 WO HCPCS SELF ADMINISTERED DRUGS (ALT 637 FOR MEDICARE OP): Performed by: INTERNAL MEDICINE

## 2025-03-11 PROCEDURE — 99239 HOSP IP/OBS DSCHRG MGMT >30: CPT | Performed by: INTERNAL MEDICINE

## 2025-03-11 PROCEDURE — S4991 NICOTINE PATCH NONLEGEND: HCPCS | Performed by: INTERNAL MEDICINE

## 2025-03-11 PROCEDURE — 94760 N-INVAS EAR/PLS OXIMETRY 1: CPT

## 2025-03-11 PROCEDURE — 2500000002 HC RX 250 W HCPCS SELF ADMINISTERED DRUGS (ALT 637 FOR MEDICARE OP, ALT 636 FOR OP/ED): Performed by: INTERNAL MEDICINE

## 2025-03-11 PROCEDURE — 82947 ASSAY GLUCOSE BLOOD QUANT: CPT

## 2025-03-11 PROCEDURE — 2500000004 HC RX 250 GENERAL PHARMACY W/ HCPCS (ALT 636 FOR OP/ED): Performed by: HOSPITALIST

## 2025-03-11 PROCEDURE — 2500000005 HC RX 250 GENERAL PHARMACY W/O HCPCS: Performed by: INTERNAL MEDICINE

## 2025-03-11 PROCEDURE — 97535 SELF CARE MNGMENT TRAINING: CPT | Mod: GO,CO

## 2025-03-11 PROCEDURE — 2500000002 HC RX 250 W HCPCS SELF ADMINISTERED DRUGS (ALT 637 FOR MEDICARE OP, ALT 636 FOR OP/ED): Performed by: HOSPITALIST

## 2025-03-11 PROCEDURE — 94640 AIRWAY INHALATION TREATMENT: CPT

## 2025-03-11 PROCEDURE — RXMED WILLOW AMBULATORY MEDICATION CHARGE

## 2025-03-11 PROCEDURE — 9420000001 HC RT PATIENT EDUCATION 5 MIN

## 2025-03-11 RX ORDER — IBUPROFEN 200 MG
1 TABLET ORAL DAILY
Qty: 28 PATCH | Refills: 0 | Status: SHIPPED | OUTPATIENT
Start: 2025-03-12 | End: 2025-04-11

## 2025-03-11 RX ORDER — OSELTAMIVIR PHOSPHATE 75 MG/1
75 CAPSULE ORAL 2 TIMES DAILY
Qty: 1 CAPSULE | Refills: 0 | Status: SHIPPED | OUTPATIENT
Start: 2025-03-11 | End: 2025-03-12

## 2025-03-11 RX ORDER — AMOXICILLIN 500 MG/1
500 CAPSULE ORAL EVERY 6 HOURS
Qty: 11 CAPSULE | Refills: 0 | Status: SHIPPED | OUTPATIENT
Start: 2025-03-11 | End: 2025-03-14

## 2025-03-11 RX ORDER — FLUTICASONE FUROATE AND VILANTEROL 100; 25 UG/1; UG/1
1 POWDER RESPIRATORY (INHALATION) DAILY
Qty: 60 EACH | Refills: 0 | Status: SHIPPED | OUTPATIENT
Start: 2025-03-11 | End: 2025-04-10

## 2025-03-11 RX ORDER — PREDNISONE 20 MG/1
40 TABLET ORAL DAILY
Qty: 6 TABLET | Refills: 0 | Status: SHIPPED | OUTPATIENT
Start: 2025-03-11 | End: 2025-03-14

## 2025-03-11 RX ORDER — IPRATROPIUM BROMIDE AND ALBUTEROL SULFATE 2.5; .5 MG/3ML; MG/3ML
3 SOLUTION RESPIRATORY (INHALATION) EVERY 6 HOURS PRN
Status: DISCONTINUED | OUTPATIENT
Start: 2025-03-11 | End: 2025-03-11 | Stop reason: HOSPADM

## 2025-03-11 RX ORDER — FLUTICASONE FUROATE AND VILANTEROL 100; 25 UG/1; UG/1
1 POWDER RESPIRATORY (INHALATION) DAILY
Qty: 60 EACH | Refills: 1 | Status: SHIPPED | OUTPATIENT
Start: 2025-03-11

## 2025-03-11 RX ORDER — PREDNISONE 20 MG/1
40 TABLET ORAL DAILY
Status: DISCONTINUED | OUTPATIENT
Start: 2025-03-11 | End: 2025-03-11 | Stop reason: HOSPADM

## 2025-03-11 RX ADMIN — NICOTINE 1 PATCH: 14 PATCH, EXTENDED RELEASE TRANSDERMAL at 09:13

## 2025-03-11 RX ADMIN — ATORVASTATIN CALCIUM 20 MG: 20 TABLET, FILM COATED ORAL at 09:13

## 2025-03-11 RX ADMIN — AMOXICILLIN 500 MG: 500 CAPSULE ORAL at 03:03

## 2025-03-11 RX ADMIN — Medication 4 L/MIN: at 09:36

## 2025-03-11 RX ADMIN — ENOXAPARIN SODIUM 40 MG: 40 INJECTION SUBCUTANEOUS at 09:13

## 2025-03-11 RX ADMIN — LISINOPRIL 10 MG: 10 TABLET ORAL at 09:13

## 2025-03-11 RX ADMIN — SULFASALAZINE 500 MG: 500 TABLET ORAL at 09:14

## 2025-03-11 RX ADMIN — Medication 3 L/MIN: at 14:58

## 2025-03-11 RX ADMIN — IPRATROPIUM BROMIDE AND ALBUTEROL SULFATE 3 ML: 2.5; .5 SOLUTION RESPIRATORY (INHALATION) at 09:30

## 2025-03-11 RX ADMIN — ASPIRIN 81 MG CHEWABLE TABLET 81 MG: 81 TABLET CHEWABLE at 09:13

## 2025-03-11 RX ADMIN — PANTOPRAZOLE SODIUM 40 MG: 40 TABLET, DELAYED RELEASE ORAL at 06:24

## 2025-03-11 RX ADMIN — IPRATROPIUM BROMIDE AND ALBUTEROL SULFATE 3 ML: 2.5; .5 SOLUTION RESPIRATORY (INHALATION) at 14:58

## 2025-03-11 RX ADMIN — METHYLPREDNISOLONE SODIUM SUCCINATE 30 MG: 40 INJECTION, POWDER, FOR SOLUTION INTRAMUSCULAR; INTRAVENOUS at 06:24

## 2025-03-11 RX ADMIN — TAMSULOSIN HYDROCHLORIDE 0.4 MG: 0.4 CAPSULE ORAL at 09:13

## 2025-03-11 RX ADMIN — INSULIN LISPRO 6 UNITS: 100 INJECTION, SOLUTION INTRAVENOUS; SUBCUTANEOUS at 12:08

## 2025-03-11 RX ADMIN — PREDNISONE 40 MG: 20 TABLET ORAL at 10:59

## 2025-03-11 RX ADMIN — AMOXICILLIN 500 MG: 500 CAPSULE ORAL at 14:31

## 2025-03-11 RX ADMIN — AMOXICILLIN 500 MG: 500 CAPSULE ORAL at 09:15

## 2025-03-11 RX ADMIN — OSELTAMIVIR PHOSPHATE 75 MG: 75 CAPSULE ORAL at 09:15

## 2025-03-11 RX ADMIN — AMLODIPINE BESYLATE 5 MG: 5 TABLET ORAL at 09:13

## 2025-03-11 ASSESSMENT — PAIN SCALES - PAIN ASSESSMENT IN ADVANCED DEMENTIA (PAINAD)
CONSOLABILITY: NO NEED TO CONSOLE
BREATHING: NORMAL
TOTALSCORE: 0
BODYLANGUAGE: RELAXED
FACIALEXPRESSION: SMILING OR INEXPRESSIVE

## 2025-03-11 ASSESSMENT — ACTIVITIES OF DAILY LIVING (ADL)
BATHING_LEVEL_OF_ASSISTANCE: CLOSE SUPERVISION
BATHING_WHERE_ASSESSED: OTHER (COMMENT)
HOME_MANAGEMENT_TIME_ENTRY: 34

## 2025-03-11 ASSESSMENT — COGNITIVE AND FUNCTIONAL STATUS - GENERAL
PERSONAL GROOMING: A LITTLE
MOVING TO AND FROM BED TO CHAIR: A LITTLE
STANDING UP FROM CHAIR USING ARMS: A LITTLE
EATING MEALS: A LITTLE
DRESSING REGULAR LOWER BODY CLOTHING: A LITTLE
DRESSING REGULAR UPPER BODY CLOTHING: A LITTLE
HELP NEEDED FOR BATHING: A LITTLE
CLIMB 3 TO 5 STEPS WITH RAILING: A LOT
TOILETING: A LITTLE
WALKING IN HOSPITAL ROOM: A LITTLE
DAILY ACTIVITIY SCORE: 18

## 2025-03-11 ASSESSMENT — PAIN - FUNCTIONAL ASSESSMENT: PAIN_FUNCTIONAL_ASSESSMENT: 0-10

## 2025-03-11 ASSESSMENT — PAIN SCALES - GENERAL
PAINLEVEL_OUTOF10: 0 - NO PAIN
PAINLEVEL_OUTOF10: 0 - NO PAIN

## 2025-03-11 ASSESSMENT — PAIN SCALES - WONG BAKER: WONGBAKER_NUMERICALRESPONSE: NO HURT

## 2025-03-11 NOTE — PROGRESS NOTES
"Occupational Therapy    OT Treatment    Patient Name: Jason Martin \"Jose\"  MRN: [id number]  Department: [address]  Room: 419/419-A  Today's Date: 3/11/2025  Time Calculation  Start Time: 1416  Stop Time: 1450  Time Calculation (min): 34 min        Assessment:  OT Assessment: Pt performing at supervison levels due to poor activity tolerance. 02 sats 93%, HR 61 bpm post OT.  Prognosis: Good  Barriers to Discharge Home: Physical needs  Physical Needs: Intermittent mobility assistance needed, Intermittent ADL assistance needed  Evaluation/Treatment Tolerance: Patient limited by fatigue  End of Session Communication: Bedside nurse  End of Session Patient Position: Up in chair, Alarm on (All needs in reach.)  OT Assessment Results: Decreased ADL status, Decreased upper extremity strength, Decreased endurance, Decreased functional mobility  Prognosis: Good  Evaluation/Treatment Tolerance: Patient limited by fatigue  Strengths: Premorbid level of function  Barriers to Participation: Comorbidities  Plan:  Treatment Interventions: ADL retraining, Functional transfer training  OT Frequency: 3 times per week  OT Discharge Recommendations: Low intensity level of continued care  Equipment Recommended upon Discharge: Wheeled walker  OT Recommended Transfer Status: Assist of 1, Minimal assist  OT - OK to Discharge: Yes  Treatment Interventions: ADL retraining, Functional transfer training    Subjective   Previous Visit Info:  OT Last Visit  OT Received On: 03/11/25  General:  General  Reason for Referral: Impaired mobility, Influenza A  Referred By: Dr. Gillette  Past Medical History Relevant to Rehab: COPD, DM, HTN  Patient Position Received: Up in chair, Alarm on (Agreeable to treatment.)  Precautions:  Hearing/Visual Limitations: + glasses  Medical Precautions: Fall precautions, Infection precautions, Oxygen therapy device and L/min (3L nc)  Precautions Comment: droplet precautions, +flu, on 4L O2 via NC     Date/Time Vitals " Session Patient Position Pulse Resp SpO2 BP MAP (mmHg)    03/11/25 1458 --  --  --  --  92 %  --  --           Pain:  Pain Assessment  Pain Assessment: 0-10  0-10 (Numeric) Pain Score: 0 - No pain    Objective    Cognition:  Cognition  Overall Cognitive Status: Within Functional Limits  Orientation Level: Oriented X4  Following Commands: Follows all commands and directions without difficulty  Coordination:  Movements are Fluid and Coordinated: No  Coordination Comment: decreased speed/accuracy of movement  Activities of Daily Living: Grooming  Grooming Level of Assistance: Setup  Grooming Where Assessed: Other (Comment) (toilet)  Grooming Comments: washing face with washrags    UE Bathing  UE Bathing Level of Assistance: Setup  UE Bathing Where Assessed: Sitting sinkside  UE Bathing Comments: Sponge bathing (needing rest break after task completion)    LE Bathing  LE Bathing Level of Assistance: Close supervision  LE Bathing Where Assessed: Other (Comment) (toilet)  LE Bathing Comments: sponge bathing BLE flexed forward at hips, unable to figure 4. Pt declines AE use. Pt needing rest break after task completion    UE Dressing  UE Dressing Level of Assistance: Setup  UE Dressing Where Assessed:  (toilet)  UE Dressing Comments: donning overhead shirt, needing rest break after task completion    LE Dressing  Pants Level of Assistance: Close supervision  LE Dressing Where Assessed: Toilet  LE Dressing Comments: pt donned pants flexed hips technique. Pt needing rest break after task completion.  Functional Standing Tolerance:     Bed Mobility/Transfers: Transfer 1  Transfer From 1: Chair with arms to  Transfer to 1: Toilet  Technique 1: To right, To left  Transfer Device 1: Walker  Transfer Level of Assistance 1: Close supervision  Trials/Comments 1: cues for use of grab bars  Transfers 2  Transfer From 2: Toilet to  Transfer to 2: Chair with arms  Technique 2: To right, To left  Transfer Device 2: Walker  Transfer Level  of Assistance 2: Close supervision  Trials/Comments 2: cues for safe hand placement    Functional Mobility:  Functional Mobility 1  Surface 1: Level tile  Device 1: Rolling walker  Assistance 1: Contact guard  Comments 1: short household distance x2 (cues for safe proximity of self to device)    Outcome Measures:[facility] Daily Activity  Putting on and taking off regular lower body clothing: A little  Bathing (including washing, rinsing, drying): A little  Putting on and taking off regular upper body clothing: A little  Toileting, which includes using toilet, bedpan or urinal: A little  Taking care of personal grooming such as brushing teeth: A little  Eating Meals: A little  Daily Activity - Total Score: 18    Education Documentation  ADL Training, taught by ABBY Ibarra at 3/11/2025  3:16 PM.  Learner: Patient  Readiness: Acceptance  Method: Explanation  Response: Verbalizes Understanding, Demonstrated Understanding, Needs Reinforcement  Comment: Energy Saving Principles with self care    Education Comments  No comments found.      Goals:  Encounter Problems       Encounter Problems (Active)       OT Goals       ADLS (Progressing)       Start:  03/08/25    Expected End:  03/22/25       Patient will complete ADL tasks, with modified independence using AE as needed in order to increase patient's safety and independence with self-care tasks.           Functional Transfers (Progressing)       Start:  03/08/25    Expected End:  03/22/25       Patient will complete functional transfers with modified independence using least restrictive device, in order to increase patient's safety and independence with daily tasks.           Activity Tolerance (Progressing)       Start:  03/08/25    Expected End:  03/22/25       Patient will demonstrate the ability to participate in functional activity at least >/= 20 minutes in order to increase patient's safety and independence with daily tasks.

## 2025-03-11 NOTE — HH CARE COORDINATION
Home Care received a Referral for Nursing and Physical Therapy. We have processed the referral for a Start of Care on 03/11.     If you have any questions or concerns, please feel free to contact us at [phone]. Follow the prompts, enter your five digit zip code, and you will be directed to your care team on E[facility] 1.

## 2025-03-11 NOTE — CARE PLAN
PT ASSESSED FOR HOME O2. PT QUALIFIES FOR HOME, AT REST SPO2 93%-95%, , DURING AMBULATION, SPO2 88%-89%, PT SOB, AND PURSED LIP BREATHING, POST AMBULATION, SPO2 IMPROVED. SPO2 91%, . [facility] HAS BEEN CONTACTED FOR PT HOME SETUP

## 2025-03-11 NOTE — PROGRESS NOTES
03/11/25 0850   Discharge Planning   Home or Post Acute Services In home services  (Patient requesting [facility].  LOW 17/24.  [facility] accepted patient.)   Type of Home Care Services Home nursing visits;Home PT   Expected Discharge Disposition Home H   Does the patient need discharge transport arranged? No     1045  [facility] advised that they cannot staff for SOC within 48 hours.  Referral submitted to Adelita Arreola via [facility].  Awaiting updates.    1052  [facility] by [facility].    1730  Per [facility] Arreola [facility], SOC 24 - 48 hours.

## 2025-03-11 NOTE — CARE PLAN
The patient's goals for the shift include safety and rest    The clinical goals for the shift include monitor vitals/O2 for home     Over the shift, the patient  make progress toward the following goals. Barriers to progression include none. Recommendations to address these barriers include none.

## 2025-03-12 ENCOUNTER — PATIENT OUTREACH (OUTPATIENT)
Dept: PRIMARY CARE | Facility: CLINIC | Age: 70
End: 2025-03-12
Payer: MEDICARE

## 2025-03-12 ENCOUNTER — TELEPHONE (OUTPATIENT)
Dept: RESPIRATORY THERAPY | Facility: HOSPITAL | Age: 70
End: 2025-03-12
Payer: MEDICARE

## 2025-03-12 NOTE — PROGRESS NOTES
Discharge Facility:  [facility]  Discharge Diagnosis: influenza A,   Admission Date:  3/6/25  Discharge Date:   3/11/25     PCP Appointment Date:   3/21/25  Specialist Appointment Date: 6/9/25 [facility]   Hospital Encounter and Summary Linked: Yes    ED to Hosp-Admission (Discharged) with Leo Carranza MD; Kojo Calixto MD (03/06/2025)     Wrap Up  Wrap Up Additional Comments: This CM spoke with pts spouse via phone.spouse reports pt wont get on the phone.  Spouse reports pt doing well at home since discharge. New meds reviewed. O2 was delivered.  dtr is an LPN and will monitor o2 levels.  Spouse reports they have picked up all new meds and have no questions at this time. Spouse aware of my availability for non-emergent concerns. Contact info provided. (3/12/2025 10:22 AM)    Medications  Medications reviewed with patient/caregiver?: Yes (CM spoke with SPOUSE) (3/12/2025 10:22 AM)  Is the patient having any side effects they believe may be caused by any medication additions or changes?: No (3/12/2025 10:22 AM)  Does the patient have all medications ordered at discharge?: Yes (3/12/2025 10:22 AM)  Care Management Interventions: No intervention needed (3/12/2025 10:22 AM)  Prescription Comments: New scripts gievn to pt at discharge : -amoxicillin (Amoxil)   fluticasone furoate-vilanteroL (Breo Ellipta)   oseltamivir (Tamiflu)   oxygen (O2)    CHANGE how you take:  aspirin    fluticasone propion-salmeteroL (Advair HFA)   nicotine (Nicoderm CQ)   predniSONE (Deltasone)   sulfaSALAzine (Azulfidine)    STOP taking:  nitrofurantoin)   potassium citrate (3/12/2025 10:22 AM)  Is the patient taking all medications as directed (includes completed medication regime)?: Yes (3/12/2025 10:22 AM)  Care Management Interventions: Provided patient education (3/12/2025 10:22 AM)  Medication Comments: denies problems obtaining or affording medication  . (3/12/2025 10:22 AM)    Appointments  Does the  patient have a primary care provider?: Yes (3/12/2025 10:22 AM)  Care Management Interventions: Verified appointment date/time/provider (3/12/2025 10:22 AM)  Has the patient kept scheduled appointments due by today?: Yes (3/12/2025 10:22 AM)  Care Management Interventions: Advised patient to keep appointment (3/12/2025 10:22 AM)    Self Management  What is the home health agency?: [facility] (3/12/2025 10:22 AM)  Has home health visited the patient within 72 hours of discharge?: Call prior to 72 hours (3/12/2025 10:22 AM)  What Durable Medical Equipment (DME) was ordered?: Home o2 (3/12/2025 10:22 AM)  Has all Durable Medical Equipment (DME) been delivered?: Yes (3/12/2025 10:22 AM)    Patient Teaching  Does the patient have access to their discharge instructions?: No (3/12/2025 10:22 AM)  What is the patient's perception of their health status since discharge?: Improving (3/12/2025 10:22 AM)  Is the patient/caregiver able to teach back the hierarchy of who to call/visit for symptoms/problems? PCP, Specialist, Home Health nurse, Urgent Care, ED, 911: Yes (3/12/2025 10:22 AM)

## 2025-03-13 PROCEDURE — G0180 MD CERTIFICATION HHA PATIENT: HCPCS | Performed by: FAMILY MEDICINE

## 2025-03-15 NOTE — DISCHARGE SUMMARY
Discharge Diagnosis  Influenza A    Issues Requiring Follow-Up  Pulmonology follow-up and primary care follow-up    Discharge Meds     Medication List      START taking these medications     * fluticasone furoate-vilanteroL 100-25 mcg/dose inhaler; Commonly known   as: Breo Ellipta; INHALE 1 PUFF BY MOUTH ONCE DAILY   * fluticasone furoate-vilanteroL 100-25 mcg/dose inhaler; Commonly known   as: Breo Ellipta; Inhale 1 puff once daily.   oxygen gas therapy; Commonly known as: O2; Inhale 1 each every 8 hours.  * This list has 2 medication(s) that are the same as other medications   prescribed for you. Read the directions carefully, and ask your doctor or   other care provider to review them with you.     CHANGE how you take these medications     aspirin 81 mg chewable tablet; What changed: Another medication with the   same name was removed. Continue taking this medication, and follow the   directions you see here.   atorvastatin 20 mg tablet; Commonly known as: Lipitor; TAKE 1 TABLET BY   MOUTH EVERY DAY; What changed: Another medication with the same name was   removed. Continue taking this medication, and follow the directions you   see here.   fluticasone propion-salmeteroL 45-21 mcg/actuation inhaler; Commonly   known as: Advair HFA; Inhale 2 puffs 2 times a day.; What changed: Another   medication with the same name was removed. Continue taking this   medication, and follow the directions you see here.   * nicotine 21 mg/24 hr patch; Commonly known as: Nicoderm CQ; Place 1   patch over 24 hours on the skin once every 24 hours.; What changed:   Another medication with the same name was changed. Make sure you   understand how and when to take each.   * nicotine 14 mg/24 hr patch; Commonly known as: Nicoderm CQ; Place 1   patch on the skin once daily.; What changed: when to take this   sulfaSALAzine 500 mg tablet; Commonly known as: Azulfidine; Take 1   tablet (500 mg) by mouth once daily.; What changed: Another  medication   with the same name was removed. Continue taking this medication, and   follow the directions you see here.  * This list has 2 medication(s) that are the same as other medications   prescribed for you. Read the directions carefully, and ask your doctor or   other care provider to review them with you.     CONTINUE taking these medications     albuterol 90 mcg/actuation inhaler; Commonly known as: Ventolin HFA;   Inhale 2 puffs every 4 hours if needed for wheezing or shortness of   breath. For 90 days   amLODIPine 5 mg tablet; Commonly known as: Norvasc; Take 1 tablet (5 mg)   by mouth once daily.   Atrovent HFA 17 mcg/actuation inhaler; Generic drug: ipratropium; Inhale   2 puffs 4 times a day as needed for wheezing.   ergocalciferol 1250 mcg (50,000 units) capsule; Commonly known as:   Vitamin D-2   FreeStyle Lite Strips strip; Generic drug: blood sugar diagnostic   lancets misc   lisinopril 10 mg tablet; TAKE 1 TABLET BY MOUTH EVERY DAY   sertraline 50 mg tablet; Commonly known as: Zoloft; TAKE 1 TABLET BY   MOUTH EVERY DAY   tamsulosin 0.4 mg 24 hr capsule; Commonly known as: Flomax   Trelegy Ellipta 100-62.5-25 mcg blister with device; Generic drug:   fluticasone-umeclidin-vilanter     STOP taking these medications     nitrofurantoin 100 mg capsule; Commonly known as: Macrodantin   potassium citrate CR 10 mEq ER tablet; Commonly known as: Urocit-K-10   predniSONE 20 mg tablet; Commonly known as: Deltasone     ASK your doctor about these medications     amoxicillin 500 mg capsule; Commonly known as: Amoxil; Take 1 capsule   (500 mg) by mouth every 6 hours; Ask about: Should I take this medication?   oseltamivir 75 mg capsule; Commonly known as: Tamiflu; Take 1 capsule   (75 mg) by mouth for 1 dose.; Ask about: Should I take this medication?   predniSONE 20 mg tablet; Commonly known as: Deltasone; Take 2 tablets   (40 mg) by mouth once daily; Ask about: Should I take this medication?       Test Results  Pending At Discharge  Pending Labs       No current pending labs.            Hospital Course   Admitted to hospital for acute hypoxic respiratory failure secondary to influenza and strep pneumonia in the setting of COPD and possible COPD exacerbation.  Patient was managed with antibiotics, Tamiflu, steroids, inhalers.  Respiratory therapy was consulted as well.  Patient did well with standard of care treatment and was able to be discharged home on a small amount of oxygen when ambulating with home health care.  Patient tolerated his hospital course well.  Patient transition to p.o. medication amoxicillin for strep pneumonia, will continue this until 3/14/2025 also was given Tamiflu to finish and steroids until 3/14/2025.  Patient was discharged home in stable improved condition.    Pertinent Physical Exam At Time of Discharge  Physical Exam  Generally no acute distress  HEENT PERRL EOMI  Cardiovascular S1-S2 regular rate rhythm  Lungs very distant bilaterally clear today  Abdomen nontender bowel sounds present  Extremities no clubbing cyanosis rupali  Outpatient Follow-Up  Future Appointments   Date Time Provider Department Center   3/21/2025  2:15 PM Tomer Mathews DO [id number] [facility]   Total time spent in discharge 35 minutes    Leo Carranza MD

## 2025-03-21 ENCOUNTER — APPOINTMENT (OUTPATIENT)
Dept: PRIMARY CARE | Facility: CLINIC | Age: 70
End: 2025-03-21
Payer: MEDICARE

## 2025-03-26 ENCOUNTER — PATIENT OUTREACH (OUTPATIENT)
Dept: PRIMARY CARE | Facility: CLINIC | Age: 70
End: 2025-03-26
Payer: MEDICARE

## 2025-03-26 NOTE — PROGRESS NOTES
Call regarding hospitalization. This cm spoke with spouse. Pt had to cancel pcp appt on 3/21 and changed to 3/28/25.  Wife states c came one time and has not been back. Phone number provided to spouse to call [facility].  She will all me if there are any issues. Contact info provided.

## 2025-03-28 ENCOUNTER — OFFICE VISIT (OUTPATIENT)
Dept: PRIMARY CARE | Facility: CLINIC | Age: 70
End: 2025-03-28
Payer: MEDICARE

## 2025-03-28 VITALS
SYSTOLIC BLOOD PRESSURE: 158 MMHG | HEART RATE: 105 BPM | HEIGHT: 65 IN | TEMPERATURE: 96.9 F | BODY MASS INDEX: 18.29 KG/M2 | WEIGHT: 109.8 LBS | RESPIRATION RATE: 20 BRPM | OXYGEN SATURATION: 92 % | DIASTOLIC BLOOD PRESSURE: 60 MMHG

## 2025-03-28 DIAGNOSIS — R35.0 URINARY FREQUENCY: ICD-10-CM

## 2025-03-28 DIAGNOSIS — J18.9 PNEUMONIA DUE TO INFECTIOUS ORGANISM, UNSPECIFIED LATERALITY, UNSPECIFIED PART OF LUNG: Primary | ICD-10-CM

## 2025-03-28 PROCEDURE — 3044F HG A1C LEVEL LT 7.0%: CPT | Performed by: FAMILY MEDICINE

## 2025-03-28 PROCEDURE — 1126F AMNT PAIN NOTED NONE PRSNT: CPT | Performed by: FAMILY MEDICINE

## 2025-03-28 PROCEDURE — 99213 OFFICE O/P EST LOW 20 MIN: CPT | Performed by: FAMILY MEDICINE

## 2025-03-28 PROCEDURE — 1111F DSCHRG MED/CURRENT MED MERGE: CPT | Performed by: FAMILY MEDICINE

## 2025-03-28 PROCEDURE — 1159F MED LIST DOCD IN RCRD: CPT | Performed by: FAMILY MEDICINE

## 2025-03-28 PROCEDURE — 3077F SYST BP >= 140 MM HG: CPT | Performed by: FAMILY MEDICINE

## 2025-03-28 PROCEDURE — 4010F ACE/ARB THERAPY RXD/TAKEN: CPT | Performed by: FAMILY MEDICINE

## 2025-03-28 PROCEDURE — 3008F BODY MASS INDEX DOCD: CPT | Performed by: FAMILY MEDICINE

## 2025-03-28 PROCEDURE — 1123F ACP DISCUSS/DSCN MKR DOCD: CPT | Performed by: FAMILY MEDICINE

## 2025-03-28 PROCEDURE — 4004F PT TOBACCO SCREEN RCVD TLK: CPT | Performed by: FAMILY MEDICINE

## 2025-03-28 PROCEDURE — 3078F DIAST BP <80 MM HG: CPT | Performed by: FAMILY MEDICINE

## 2025-03-28 RX ORDER — PREDNISONE 20 MG/1
TABLET ORAL
Qty: 20 TABLET | Refills: 0 | Status: SHIPPED | OUTPATIENT
Start: 2025-03-28 | End: 2025-04-10

## 2025-03-28 RX ORDER — BENZONATATE 200 MG/1
200 CAPSULE ORAL 3 TIMES DAILY PRN
Qty: 42 CAPSULE | Refills: 0 | Status: SHIPPED | OUTPATIENT
Start: 2025-03-28 | End: 2025-04-27

## 2025-03-28 ASSESSMENT — PATIENT HEALTH QUESTIONNAIRE - PHQ9
2. FEELING DOWN, DEPRESSED OR HOPELESS: NOT AT ALL
SUM OF ALL RESPONSES TO PHQ9 QUESTIONS 1 AND 2: 0
1. LITTLE INTEREST OR PLEASURE IN DOING THINGS: NOT AT ALL

## 2025-03-28 ASSESSMENT — PAIN SCALES - GENERAL: PAINLEVEL_OUTOF10: 0-NO PAIN

## 2025-03-29 DIAGNOSIS — E78.5 HYPERLIPIDEMIA, UNSPECIFIED: ICD-10-CM

## 2025-03-31 ENCOUNTER — CLINICAL SUPPORT (OUTPATIENT)
Dept: PRIMARY CARE | Facility: CLINIC | Age: 70
End: 2025-03-31
Payer: MEDICARE

## 2025-03-31 DIAGNOSIS — E11.9 TYPE 2 DIABETES MELLITUS WITHOUT COMPLICATION, UNSPECIFIED WHETHER LONG TERM INSULIN USE: ICD-10-CM

## 2025-03-31 DIAGNOSIS — R35.0 URINARY FREQUENCY: ICD-10-CM

## 2025-03-31 LAB
POC APPEARANCE, URINE: CLEAR
POC BILIRUBIN, URINE: NEGATIVE
POC BLOOD, URINE: NEGATIVE
POC COLOR, URINE: YELLOW
POC GLUCOSE, URINE: ABNORMAL MG/DL
POC KETONES, URINE: NEGATIVE MG/DL
POC LEUKOCYTES, URINE: NEGATIVE
POC NITRITE,URINE: NEGATIVE
POC PH, URINE: 5.5 PH
POC PROTEIN, URINE: NEGATIVE MG/DL
POC SPECIFIC GRAVITY, URINE: 1.01
POC UROBILINOGEN, URINE: 0.2 EU/DL

## 2025-03-31 PROCEDURE — 81003 URINALYSIS AUTO W/O SCOPE: CPT | Mod: QW | Performed by: FAMILY MEDICINE

## 2025-03-31 PROCEDURE — 81003 URINALYSIS AUTO W/O SCOPE: CPT

## 2025-03-31 RX ORDER — ATORVASTATIN CALCIUM 20 MG/1
20 TABLET, FILM COATED ORAL DAILY
Qty: 90 TABLET | Refills: 1 | Status: SHIPPED | OUTPATIENT
Start: 2025-03-31

## 2025-04-02 LAB
ALBUMIN/CREAT UR: 24 MG/G CREAT
BACTERIA UR CULT: NORMAL
CREAT UR-MCNC: 37 MG/DL (ref 20–320)
MICROALBUMIN UR-MCNC: 0.9 MG/DL

## 2025-06-02 DIAGNOSIS — I10 ACCELERATED HYPERTENSION: ICD-10-CM

## 2025-06-02 RX ORDER — LISINOPRIL 10 MG/1
10 TABLET ORAL DAILY
Qty: 90 TABLET | Refills: 0 | Status: SHIPPED | OUTPATIENT
Start: 2025-06-02

## 2025-06-12 DIAGNOSIS — I10 HYPERTENSION, UNSPECIFIED TYPE: ICD-10-CM

## 2025-06-12 DIAGNOSIS — J44.9 CHRONIC OBSTRUCTIVE PULMONARY DISEASE, UNSPECIFIED COPD TYPE (MULTI): ICD-10-CM

## 2025-06-12 DIAGNOSIS — E78.5 HYPERLIPIDEMIA, UNSPECIFIED: ICD-10-CM

## 2025-06-12 DIAGNOSIS — K52.9 CHRONIC COLITIS: ICD-10-CM

## 2025-06-12 RX ORDER — AMLODIPINE BESYLATE 5 MG/1
5 TABLET ORAL DAILY
Qty: 30 TABLET | Refills: 5 | Status: SHIPPED | OUTPATIENT
Start: 2025-06-12 | End: 2025-12-09

## 2025-06-12 RX ORDER — SULFASALAZINE 500 MG/1
500 TABLET ORAL DAILY
Qty: 90 TABLET | Refills: 3 | Status: SHIPPED | OUTPATIENT
Start: 2025-06-12

## 2025-06-12 RX ORDER — ATORVASTATIN CALCIUM 20 MG/1
20 TABLET, FILM COATED ORAL DAILY
Qty: 90 TABLET | Refills: 1 | Status: SHIPPED | OUTPATIENT
Start: 2025-06-12

## 2025-06-12 RX ORDER — ALBUTEROL SULFATE 90 UG/1
2 INHALANT RESPIRATORY (INHALATION) EVERY 4 HOURS PRN
Qty: 18 G | Refills: 3 | Status: SHIPPED | OUTPATIENT
Start: 2025-06-12

## 2025-06-25 ENCOUNTER — TELEPHONE (OUTPATIENT)
Dept: RESPIRATORY THERAPY | Facility: HOSPITAL | Age: 70
End: 2025-06-25

## 2025-07-17 ENCOUNTER — OFFICE VISIT (OUTPATIENT)
Dept: PRIMARY CARE | Facility: CLINIC | Age: 70
End: 2025-07-17
Payer: MEDICARE

## 2025-07-17 VITALS
BODY MASS INDEX: 16.91 KG/M2 | TEMPERATURE: 97.7 F | HEART RATE: 94 BPM | OXYGEN SATURATION: 93 % | WEIGHT: 101.6 LBS | SYSTOLIC BLOOD PRESSURE: 122 MMHG | DIASTOLIC BLOOD PRESSURE: 72 MMHG | RESPIRATION RATE: 20 BRPM

## 2025-07-17 DIAGNOSIS — G89.29 CHRONIC RIGHT SHOULDER PAIN: ICD-10-CM

## 2025-07-17 DIAGNOSIS — M25.511 CHRONIC RIGHT SHOULDER PAIN: ICD-10-CM

## 2025-07-17 DIAGNOSIS — J44.9 CHRONIC OBSTRUCTIVE PULMONARY DISEASE, UNSPECIFIED COPD TYPE (MULTI): ICD-10-CM

## 2025-07-17 DIAGNOSIS — I10 ACCELERATED HYPERTENSION: ICD-10-CM

## 2025-07-17 DIAGNOSIS — E11.9 TYPE 2 DIABETES MELLITUS WITHOUT COMPLICATION, UNSPECIFIED WHETHER LONG TERM INSULIN USE: ICD-10-CM

## 2025-07-17 DIAGNOSIS — N64.59 OTHER SIGNS AND SYMPTOMS IN BREAST: ICD-10-CM

## 2025-07-17 DIAGNOSIS — N63.0 BREAST MASS IN MALE: Primary | ICD-10-CM

## 2025-07-17 LAB — POC HEMOGLOBIN A1C: 6.2 % (ref 4.2–6.5)

## 2025-07-17 PROCEDURE — 4010F ACE/ARB THERAPY RXD/TAKEN: CPT | Performed by: FAMILY MEDICINE

## 2025-07-17 PROCEDURE — 1125F AMNT PAIN NOTED PAIN PRSNT: CPT | Performed by: FAMILY MEDICINE

## 2025-07-17 PROCEDURE — 99214 OFFICE O/P EST MOD 30 MIN: CPT | Performed by: FAMILY MEDICINE

## 2025-07-17 PROCEDURE — 3074F SYST BP LT 130 MM HG: CPT | Performed by: FAMILY MEDICINE

## 2025-07-17 PROCEDURE — 83036 HEMOGLOBIN GLYCOSYLATED A1C: CPT | Performed by: FAMILY MEDICINE

## 2025-07-17 PROCEDURE — 3044F HG A1C LEVEL LT 7.0%: CPT | Performed by: FAMILY MEDICINE

## 2025-07-17 PROCEDURE — 3078F DIAST BP <80 MM HG: CPT | Performed by: FAMILY MEDICINE

## 2025-07-17 PROCEDURE — 1159F MED LIST DOCD IN RCRD: CPT | Performed by: FAMILY MEDICINE

## 2025-07-17 RX ORDER — LISINOPRIL 10 MG/1
10 TABLET ORAL DAILY
Qty: 90 TABLET | Refills: 3 | Status: SHIPPED | OUTPATIENT
Start: 2025-07-17

## 2025-07-17 RX ORDER — FLUTICASONE FUROATE, UMECLIDINIUM BROMIDE AND VILANTEROL TRIFENATATE 100; 62.5; 25 UG/1; UG/1; UG/1
1 POWDER RESPIRATORY (INHALATION) DAILY
Qty: 180 EACH | Refills: 3 | Status: SHIPPED | OUTPATIENT
Start: 2025-07-17

## 2025-07-17 RX ORDER — ALBUTEROL SULFATE 90 UG/1
2 INHALANT RESPIRATORY (INHALATION) EVERY 4 HOURS PRN
Qty: 18 G | Refills: 3 | Status: SHIPPED | OUTPATIENT
Start: 2025-07-17

## 2025-07-17 RX ORDER — SULFAMETHOXAZOLE AND TRIMETHOPRIM 800; 160 MG/1; MG/1
1 TABLET ORAL 2 TIMES DAILY
Qty: 14 TABLET | Refills: 0 | Status: SHIPPED | OUTPATIENT
Start: 2025-07-17 | End: 2025-07-24

## 2025-07-17 RX ORDER — PREDNISONE 20 MG/1
TABLET ORAL
Qty: 20 TABLET | Refills: 0 | Status: SHIPPED | OUTPATIENT
Start: 2025-07-17 | End: 2025-07-30

## 2025-07-17 ASSESSMENT — ENCOUNTER SYMPTOMS
LOSS OF SENSATION IN FEET: 0
DEPRESSION: 0
OCCASIONAL FEELINGS OF UNSTEADINESS: 0

## 2025-07-17 ASSESSMENT — PAIN SCALES - GENERAL: PAINLEVEL_OUTOF10: 2

## 2025-07-17 NOTE — PROGRESS NOTES
"Subjective   Patient ID: Jose Martin is a 70 y.o. male who presents for Mass (Pt states that he noticed a lump on right nipple area over a week ago with pain. ) and Diabetes.    HPI     Review of Systems    Objective   /72   Pulse 94   Temp 36.5 °C (97.7 °F)   Resp 20   Wt 46.1 kg (101 lb 9.6 oz)   SpO2 93%   BMI 16.91 kg/m²     Physical Exam  Constitutional:       General: He is not in acute distress.     Appearance: Normal appearance.     Cardiovascular:      Rate and Rhythm: Normal rate and regular rhythm.      Heart sounds: No murmur heard.  Pulmonary:      Breath sounds: Normal breath sounds. No wheezing.     Neurological:      Mental Status: He is alert.     Rt breast:mass under areola, ttpPatient ID: Jason Martin \"Jasmine" is a 70 y.o. male.    Joint Injection Large/Arthrocentesis: R glenohumeral on 7/18/2025 11:27 AM  Indications: pain  Details: 22 G needle, posterior approach  Medications: 2.5 mg triamcinolone acetonide 40 mg/mL  Outcome: tolerated well, no immediate complications  Procedure, treatment alternatives, risks and benefits explained, specific risks discussed. Consent was given by the patient. Immediately prior to procedure a time out was called to verify the correct patient, procedure, equipment, support staff and site/side marked as required. Patient was prepped and draped in the usual sterile fashion.           Assessment/Plan   Problem List Items Addressed This Visit           ICD-10-CM    Type 2 diabetes mellitus without complication E11.9    Relevant Medications    lisinopril 10 mg tablet    Other Relevant Orders    POCT glycosylated hemoglobin (Hb A1C) manually resulted (Completed)     Other Visit Diagnoses         Codes      Breast mass in male    -  Primary N63.0    Relevant Medications    sulfamethoxazole-trimethoprim (Bactrim DS) 800-160 mg tablet    Other Relevant Orders    BI mammo right diagnostic tomosynthesis      Chronic right shoulder pain     M25.511, G89.29    " Relevant Medications    predniSONE (Deltasone) 20 mg tablet      Chronic obstructive pulmonary disease, unspecified COPD type (Multi)     J44.9    Relevant Medications    albuterol (Ventolin HFA) 90 mcg/actuation inhaler    Trelegy Ellipta 100-62.5-25 mcg blister with device    Other Relevant Orders    Disability Placard      Accelerated hypertension     I10    Relevant Medications    lisinopril 10 mg tablet      Other signs and symptoms in breast     N64.59    Relevant Orders    BI mammo right diagnostic tomosynthesis

## 2025-07-18 PROCEDURE — 20610 DRAIN/INJ JOINT/BURSA W/O US: CPT | Performed by: FAMILY MEDICINE

## 2025-07-18 RX ORDER — TRIAMCINOLONE ACETONIDE 40 MG/ML
2.5 INJECTION, SUSPENSION INTRA-ARTICULAR; INTRAMUSCULAR
Status: COMPLETED | OUTPATIENT
Start: 2025-07-18 | End: 2025-07-18

## 2025-07-18 RX ADMIN — TRIAMCINOLONE ACETONIDE 2.5 MG: 40 INJECTION, SUSPENSION INTRA-ARTICULAR; INTRAMUSCULAR at 11:27

## 2025-07-21 ENCOUNTER — HOSPITAL ENCOUNTER (OUTPATIENT)
Dept: RADIOLOGY | Facility: HOSPITAL | Age: 70
Discharge: HOME | End: 2025-07-21
Payer: MEDICARE

## 2025-07-21 VITALS — WEIGHT: 101 LBS | HEIGHT: 65 IN | BODY MASS INDEX: 16.83 KG/M2

## 2025-07-21 DIAGNOSIS — N64.59 OTHER SIGNS AND SYMPTOMS IN BREAST: ICD-10-CM

## 2025-07-21 DIAGNOSIS — N63.0 BREAST MASS IN MALE: ICD-10-CM

## 2025-07-21 DIAGNOSIS — I73.9 PERIPHERAL VASCULAR DISEASE: ICD-10-CM

## 2025-07-21 DIAGNOSIS — E78.5 HYPERLIPIDEMIA, UNSPECIFIED: ICD-10-CM

## 2025-07-21 PROCEDURE — 77062 BREAST TOMOSYNTHESIS BI: CPT | Performed by: RADIOLOGY

## 2025-07-21 PROCEDURE — 77062 BREAST TOMOSYNTHESIS BI: CPT

## 2025-07-21 PROCEDURE — 77066 DX MAMMO INCL CAD BI: CPT | Performed by: RADIOLOGY

## 2025-07-21 RX ORDER — ATORVASTATIN CALCIUM 20 MG/1
20 TABLET, FILM COATED ORAL DAILY
Qty: 90 TABLET | Refills: 1 | Status: SHIPPED | OUTPATIENT
Start: 2025-07-21

## 2025-07-21 RX ORDER — NAPROXEN SODIUM 220 MG/1
81 TABLET, FILM COATED ORAL DAILY
Qty: 90 TABLET | Refills: 1 | Status: SHIPPED | OUTPATIENT
Start: 2025-07-21 | End: 2026-01-17

## 2025-07-29 DIAGNOSIS — J44.9 CHRONIC OBSTRUCTIVE PULMONARY DISEASE, UNSPECIFIED COPD TYPE (MULTI): ICD-10-CM

## 2025-07-29 RX ORDER — IPRATROPIUM BROMIDE 17 UG/1
2 AEROSOL, METERED RESPIRATORY (INHALATION) 4 TIMES DAILY PRN
Qty: 38.7 G | Refills: 11 | Status: SHIPPED | OUTPATIENT
Start: 2025-07-29 | End: 2026-07-29

## 2025-09-04 ENCOUNTER — OFFICE VISIT (OUTPATIENT)
Dept: SURGERY | Facility: CLINIC | Age: 70
End: 2025-09-04
Payer: MEDICARE

## 2025-09-04 VITALS
RESPIRATION RATE: 17 BRPM | HEIGHT: 65 IN | WEIGHT: 101 LBS | DIASTOLIC BLOOD PRESSURE: 67 MMHG | SYSTOLIC BLOOD PRESSURE: 119 MMHG | BODY MASS INDEX: 16.83 KG/M2 | OXYGEN SATURATION: 91 % | HEART RATE: 84 BPM | TEMPERATURE: 97.8 F

## 2025-09-04 DIAGNOSIS — N62 GYNECOMASTIA, MALE: Primary | ICD-10-CM

## 2025-09-04 PROCEDURE — 4010F ACE/ARB THERAPY RXD/TAKEN: CPT | Performed by: SURGERY

## 2025-09-04 PROCEDURE — 99204 OFFICE O/P NEW MOD 45 MIN: CPT | Performed by: SURGERY

## 2025-09-04 PROCEDURE — 1125F AMNT PAIN NOTED PAIN PRSNT: CPT | Performed by: SURGERY

## 2025-09-04 PROCEDURE — 3074F SYST BP LT 130 MM HG: CPT | Performed by: SURGERY

## 2025-09-04 PROCEDURE — 3078F DIAST BP <80 MM HG: CPT | Performed by: SURGERY

## 2025-09-04 PROCEDURE — 99214 OFFICE O/P EST MOD 30 MIN: CPT | Performed by: SURGERY

## 2025-09-04 PROCEDURE — 4004F PT TOBACCO SCREEN RCVD TLK: CPT | Performed by: SURGERY

## 2025-09-04 PROCEDURE — 1159F MED LIST DOCD IN RCRD: CPT | Performed by: SURGERY

## 2025-09-04 PROCEDURE — 3044F HG A1C LEVEL LT 7.0%: CPT | Performed by: SURGERY

## 2025-09-04 PROCEDURE — 3008F BODY MASS INDEX DOCD: CPT | Performed by: SURGERY

## 2025-09-04 ASSESSMENT — COLUMBIA-SUICIDE SEVERITY RATING SCALE - C-SSRS
1. IN THE PAST MONTH, HAVE YOU WISHED YOU WERE DEAD OR WISHED YOU COULD GO TO SLEEP AND NOT WAKE UP?: NO
2. HAVE YOU ACTUALLY HAD ANY THOUGHTS OF KILLING YOURSELF?: NO
6. HAVE YOU EVER DONE ANYTHING, STARTED TO DO ANYTHING, OR PREPARED TO DO ANYTHING TO END YOUR LIFE?: NO

## 2025-09-04 ASSESSMENT — PATIENT HEALTH QUESTIONNAIRE - PHQ9
SUM OF ALL RESPONSES TO PHQ9 QUESTIONS 1 & 2: 0
1. LITTLE INTEREST OR PLEASURE IN DOING THINGS: NOT AT ALL
2. FEELING DOWN, DEPRESSED OR HOPELESS: NOT AT ALL

## 2025-09-04 ASSESSMENT — LIFESTYLE VARIABLES
SKIP TO QUESTIONS 9-10: 1
HOW MANY STANDARD DRINKS CONTAINING ALCOHOL DO YOU HAVE ON A TYPICAL DAY: PATIENT DOES NOT DRINK
HOW OFTEN DO YOU HAVE A DRINK CONTAINING ALCOHOL: NEVER
AUDIT-C TOTAL SCORE: 0
HOW OFTEN DO YOU HAVE SIX OR MORE DRINKS ON ONE OCCASION: NEVER

## 2025-09-04 ASSESSMENT — ENCOUNTER SYMPTOMS
LOSS OF SENSATION IN FEET: 0
DEPRESSION: 0
OCCASIONAL FEELINGS OF UNSTEADINESS: 0

## 2025-09-04 ASSESSMENT — PAIN SCALES - GENERAL: PAINLEVEL_OUTOF10: 5
